# Patient Record
Sex: FEMALE | Race: WHITE | NOT HISPANIC OR LATINO | URBAN - METROPOLITAN AREA
[De-identification: names, ages, dates, MRNs, and addresses within clinical notes are randomized per-mention and may not be internally consistent; named-entity substitution may affect disease eponyms.]

---

## 2024-11-03 ENCOUNTER — EMERGENCY (EMERGENCY)
Facility: HOSPITAL | Age: 59
LOS: 1 days | Discharge: HOME | End: 2024-11-03
Attending: EMERGENCY MEDICINE | Admitting: EMERGENCY MEDICINE
Payer: COMMERCIAL

## 2024-11-03 VITALS
HEART RATE: 99 BPM | DIASTOLIC BLOOD PRESSURE: 91 MMHG | TEMPERATURE: 98 F | SYSTOLIC BLOOD PRESSURE: 149 MMHG | WEIGHT: 179.9 LBS | OXYGEN SATURATION: 100 % | RESPIRATION RATE: 18 BRPM

## 2024-11-03 VITALS
HEART RATE: 94 BPM | OXYGEN SATURATION: 95 % | SYSTOLIC BLOOD PRESSURE: 143 MMHG | RESPIRATION RATE: 20 BRPM | DIASTOLIC BLOOD PRESSURE: 85 MMHG

## 2024-11-03 LAB
ALBUMIN SERPL ELPH-MCNC: 3.5 G/DL — SIGNIFICANT CHANGE UP (ref 3.3–5)
ALP SERPL-CCNC: 100 U/L — SIGNIFICANT CHANGE UP (ref 40–120)
ALT FLD-CCNC: 21 U/L — SIGNIFICANT CHANGE UP (ref 10–45)
ANION GAP SERPL CALC-SCNC: 11 MMOL/L — SIGNIFICANT CHANGE UP (ref 5–17)
APPEARANCE UR: CLEAR — SIGNIFICANT CHANGE UP
AST SERPL-CCNC: 19 U/L — SIGNIFICANT CHANGE UP (ref 10–40)
BACTERIA # UR AUTO: NEGATIVE /HPF — SIGNIFICANT CHANGE UP
BASOPHILS # BLD AUTO: 0.03 K/UL — SIGNIFICANT CHANGE UP (ref 0–0.2)
BASOPHILS NFR BLD AUTO: 0.2 % — SIGNIFICANT CHANGE UP (ref 0–2)
BILIRUB SERPL-MCNC: 0.9 MG/DL — SIGNIFICANT CHANGE UP (ref 0.2–1.2)
BILIRUB UR-MCNC: NEGATIVE — SIGNIFICANT CHANGE UP
BUN SERPL-MCNC: 24 MG/DL — HIGH (ref 7–23)
CALCIUM SERPL-MCNC: 9.4 MG/DL — SIGNIFICANT CHANGE UP (ref 8.4–10.5)
CHLORIDE SERPL-SCNC: 99 MMOL/L — SIGNIFICANT CHANGE UP (ref 96–108)
CO2 SERPL-SCNC: 27 MMOL/L — SIGNIFICANT CHANGE UP (ref 22–31)
COLOR SPEC: SIGNIFICANT CHANGE UP
CREAT SERPL-MCNC: 0.87 MG/DL — SIGNIFICANT CHANGE UP (ref 0.5–1.3)
DIFF PNL FLD: ABNORMAL
EGFR: 78 ML/MIN/1.73M2 — SIGNIFICANT CHANGE UP
EOSINOPHIL # BLD AUTO: 0.01 K/UL — SIGNIFICANT CHANGE UP (ref 0–0.5)
EOSINOPHIL NFR BLD AUTO: 0.1 % — SIGNIFICANT CHANGE UP (ref 0–6)
EPI CELLS # UR: 3 — SIGNIFICANT CHANGE UP
GLUCOSE SERPL-MCNC: 158 MG/DL — HIGH (ref 70–99)
GLUCOSE UR QL: NEGATIVE MG/DL — SIGNIFICANT CHANGE UP
HCT VFR BLD CALC: 51.9 % — HIGH (ref 34.5–45)
HGB BLD-MCNC: 18.1 G/DL — HIGH (ref 11.5–15.5)
IMM GRANULOCYTES NFR BLD AUTO: 0.4 % — SIGNIFICANT CHANGE UP (ref 0–0.9)
KETONES UR-MCNC: ABNORMAL MG/DL
LEUKOCYTE ESTERASE UR-ACNC: NEGATIVE — SIGNIFICANT CHANGE UP
LIDOCAIN IGE QN: 11 U/L — LOW (ref 16–77)
LYMPHOCYTES # BLD AUTO: 0.81 K/UL — LOW (ref 1–3.3)
LYMPHOCYTES # BLD AUTO: 5.8 % — LOW (ref 13–44)
MCHC RBC-ENTMCNC: 31.5 PG — SIGNIFICANT CHANGE UP (ref 27–34)
MCHC RBC-ENTMCNC: 34.9 G/DL — SIGNIFICANT CHANGE UP (ref 32–36)
MCV RBC AUTO: 90.4 FL — SIGNIFICANT CHANGE UP (ref 80–100)
MONOCYTES # BLD AUTO: 0.88 K/UL — SIGNIFICANT CHANGE UP (ref 0–0.9)
MONOCYTES NFR BLD AUTO: 6.3 % — SIGNIFICANT CHANGE UP (ref 2–14)
NEUTROPHILS # BLD AUTO: 12.16 K/UL — HIGH (ref 1.8–7.4)
NEUTROPHILS NFR BLD AUTO: 87.2 % — HIGH (ref 43–77)
NITRITE UR-MCNC: NEGATIVE — SIGNIFICANT CHANGE UP
NRBC # BLD: 0 /100 WBCS — SIGNIFICANT CHANGE UP (ref 0–0)
PH UR: 5.5 — SIGNIFICANT CHANGE UP (ref 5–8)
PLATELET # BLD AUTO: 222 K/UL — SIGNIFICANT CHANGE UP (ref 150–400)
POTASSIUM SERPL-MCNC: 3.2 MMOL/L — LOW (ref 3.5–5.3)
POTASSIUM SERPL-SCNC: 3.2 MMOL/L — LOW (ref 3.5–5.3)
PROT SERPL-MCNC: 7.8 G/DL — SIGNIFICANT CHANGE UP (ref 6–8.3)
PROT UR-MCNC: 100 MG/DL
RBC # BLD: 5.74 M/UL — HIGH (ref 3.8–5.2)
RBC # FLD: 13.1 % — SIGNIFICANT CHANGE UP (ref 10.3–14.5)
RBC CASTS # UR COMP ASSIST: 4 /HPF — SIGNIFICANT CHANGE UP (ref 0–4)
SODIUM SERPL-SCNC: 137 MMOL/L — SIGNIFICANT CHANGE UP (ref 135–145)
SP GR SPEC: 1.03 — SIGNIFICANT CHANGE UP (ref 1–1.03)
UROBILINOGEN FLD QL: 1 MG/DL — SIGNIFICANT CHANGE UP (ref 0.2–1)
WBC # BLD: 13.95 K/UL — HIGH (ref 3.8–10.5)
WBC # FLD AUTO: 13.95 K/UL — HIGH (ref 3.8–10.5)
WBC UR QL: 1 /HPF — SIGNIFICANT CHANGE UP (ref 0–5)

## 2024-11-03 PROCEDURE — 96361 HYDRATE IV INFUSION ADD-ON: CPT

## 2024-11-03 PROCEDURE — 93005 ELECTROCARDIOGRAM TRACING: CPT

## 2024-11-03 PROCEDURE — 85025 COMPLETE CBC W/AUTO DIFF WBC: CPT

## 2024-11-03 PROCEDURE — 96365 THER/PROPH/DIAG IV INF INIT: CPT | Mod: XU

## 2024-11-03 PROCEDURE — 93010 ELECTROCARDIOGRAM REPORT: CPT

## 2024-11-03 PROCEDURE — 74177 CT ABD & PELVIS W/CONTRAST: CPT | Mod: 26,MC

## 2024-11-03 PROCEDURE — 71045 X-RAY EXAM CHEST 1 VIEW: CPT

## 2024-11-03 PROCEDURE — 99285 EMERGENCY DEPT VISIT HI MDM: CPT

## 2024-11-03 PROCEDURE — 83690 ASSAY OF LIPASE: CPT

## 2024-11-03 PROCEDURE — 81001 URINALYSIS AUTO W/SCOPE: CPT

## 2024-11-03 PROCEDURE — 96375 TX/PRO/DX INJ NEW DRUG ADDON: CPT

## 2024-11-03 PROCEDURE — 74177 CT ABD & PELVIS W/CONTRAST: CPT | Mod: MC

## 2024-11-03 PROCEDURE — 96376 TX/PRO/DX INJ SAME DRUG ADON: CPT

## 2024-11-03 PROCEDURE — 80053 COMPREHEN METABOLIC PANEL: CPT

## 2024-11-03 PROCEDURE — 99285 EMERGENCY DEPT VISIT HI MDM: CPT | Mod: 25

## 2024-11-03 PROCEDURE — 36415 COLL VENOUS BLD VENIPUNCTURE: CPT

## 2024-11-03 PROCEDURE — 71045 X-RAY EXAM CHEST 1 VIEW: CPT | Mod: 26

## 2024-11-03 RX ORDER — SODIUM CHLORIDE 9 MG/ML
500 INJECTION, SOLUTION INTRAMUSCULAR; INTRAVENOUS; SUBCUTANEOUS ONCE
Refills: 0 | Status: COMPLETED | OUTPATIENT
Start: 2024-11-03 | End: 2024-11-03

## 2024-11-03 RX ORDER — ONDANSETRON HYDROCHLORIDE 2 MG/ML
4 INJECTION, SOLUTION INTRAMUSCULAR; INTRAVENOUS ONCE
Refills: 0 | Status: COMPLETED | OUTPATIENT
Start: 2024-11-03 | End: 2024-11-03

## 2024-11-03 RX ORDER — ONDANSETRON HYDROCHLORIDE 2 MG/ML
1 INJECTION, SOLUTION INTRAMUSCULAR; INTRAVENOUS
Qty: 1 | Refills: 0
Start: 2024-11-03 | End: 2024-11-05

## 2024-11-03 RX ORDER — METRONIDAZOLE 250 MG/1
500 TABLET ORAL ONCE
Refills: 0 | Status: COMPLETED | OUTPATIENT
Start: 2024-11-03 | End: 2024-11-03

## 2024-11-03 RX ORDER — SODIUM CHLORIDE 9 MG/ML
1500 INJECTION, SOLUTION INTRAMUSCULAR; INTRAVENOUS; SUBCUTANEOUS ONCE
Refills: 0 | Status: COMPLETED | OUTPATIENT
Start: 2024-11-03 | End: 2024-11-03

## 2024-11-03 RX ORDER — HYDROMORPHONE HCL/0.9% NACL/PF 6 MG/30 ML
1 PATIENT CONTROLLED ANALGESIA SYRINGE INTRAVENOUS ONCE
Refills: 0 | Status: DISCONTINUED | OUTPATIENT
Start: 2024-11-03 | End: 2024-11-03

## 2024-11-03 RX ORDER — CIPROFLOXACIN LACTATE 200MG/20ML
500 VIAL (ML) INTRAVENOUS ONCE
Refills: 0 | Status: COMPLETED | OUTPATIENT
Start: 2024-11-03 | End: 2024-11-03

## 2024-11-03 RX ORDER — METRONIDAZOLE 250 MG/1
1 TABLET ORAL
Qty: 30 | Refills: 0
Start: 2024-11-03 | End: 2024-11-12

## 2024-11-03 RX ORDER — CIPROFLOXACIN LACTATE 200MG/20ML
1 VIAL (ML) INTRAVENOUS
Qty: 20 | Refills: 0
Start: 2024-11-03 | End: 2024-11-12

## 2024-11-03 RX ORDER — POTASSIUM CHLORIDE 10 MEQ
40 TABLET, EXTENDED RELEASE ORAL ONCE
Refills: 0 | Status: COMPLETED | OUTPATIENT
Start: 2024-11-03 | End: 2024-11-03

## 2024-11-03 RX ORDER — FAMOTIDINE 10 MG/ML
20 INJECTION INTRAVENOUS ONCE
Refills: 0 | Status: COMPLETED | OUTPATIENT
Start: 2024-11-03 | End: 2024-11-03

## 2024-11-03 RX ADMIN — SODIUM CHLORIDE 1000 MILLILITER(S): 9 INJECTION, SOLUTION INTRAMUSCULAR; INTRAVENOUS; SUBCUTANEOUS at 13:39

## 2024-11-03 RX ADMIN — Medication 40 MILLIEQUIVALENT(S): at 12:31

## 2024-11-03 RX ADMIN — METRONIDAZOLE 500 MILLIGRAM(S): 250 TABLET ORAL at 13:39

## 2024-11-03 RX ADMIN — Medication 1 MILLIGRAM(S): at 11:45

## 2024-11-03 RX ADMIN — SODIUM CHLORIDE 1500 MILLILITER(S): 9 INJECTION, SOLUTION INTRAMUSCULAR; INTRAVENOUS; SUBCUTANEOUS at 11:29

## 2024-11-03 RX ADMIN — FAMOTIDINE 100 MILLIGRAM(S): 10 INJECTION INTRAVENOUS at 11:30

## 2024-11-03 RX ADMIN — FAMOTIDINE 20 MILLIGRAM(S): 10 INJECTION INTRAVENOUS at 12:00

## 2024-11-03 RX ADMIN — ONDANSETRON HYDROCHLORIDE 4 MILLIGRAM(S): 2 INJECTION, SOLUTION INTRAMUSCULAR; INTRAVENOUS at 11:29

## 2024-11-03 RX ADMIN — SODIUM CHLORIDE 1500 MILLILITER(S): 9 INJECTION, SOLUTION INTRAMUSCULAR; INTRAVENOUS; SUBCUTANEOUS at 13:00

## 2024-11-03 RX ADMIN — Medication 1 MILLIGRAM(S): at 11:30

## 2024-11-03 RX ADMIN — Medication 1 MILLIGRAM(S): at 13:44

## 2024-11-03 RX ADMIN — SODIUM CHLORIDE 500 MILLILITER(S): 9 INJECTION, SOLUTION INTRAMUSCULAR; INTRAVENOUS; SUBCUTANEOUS at 14:10

## 2024-11-03 RX ADMIN — Medication 1 MILLIGRAM(S): at 13:39

## 2024-11-03 RX ADMIN — Medication 500 MILLIGRAM(S): at 13:39

## 2024-11-03 NOTE — ED ADULT NURSE NOTE - NSFALLUNIVINTERV_ED_ALL_ED
Bed/Stretcher in lowest position, wheels locked, appropriate side rails in place/Call bell, personal items and telephone in reach/Instruct patient to call for assistance before getting out of bed/chair/stretcher/Non-slip footwear applied when patient is off stretcher/[place] to call system/Physically safe environment - no spills, clutter or unnecessary equipment/Purposeful proactive rounding/Room/bathroom lighting operational, light cord in reach

## 2024-11-03 NOTE — ED ADULT NURSE NOTE - OBJECTIVE STATEMENT
"Eduardo Esposito presents to the ED with c/o wound to left breast. Patient reports having a lumpectomy in June. A few days ago"It busted and gushed like a volcano." + for green drainage to left nipple in 8 o'clock area, mild erythema with slight tenderness. Patient denies any fever and is afebrile upon arrival to the ED. Patient reports that she has not followed up with Dr. Howard, but has been taking and antibiotic that was given to her by his nurse.   " pt BIB EMS from home for severe abd pain, reporting vomiting and diarrhea with abdominal pain since last night.  Patient states she thought she was coming down with a cough or cold.  She took amoxicillin that her  gave to her.  Patient states that classically she does not do well with amoxicillin however 40 minutes to an hour after taking it, she had onset of the symptoms.  Patient has been dry heaving and retching and vomiting since yesterday.  Unable to tolerate p.o.  Also having loose stool.  They question if there was pink-tinged stool and dark emesis.  Patient states she had an endoscopy not that long ago and was told that she has inflammation in the stomach.  She is on omeprazole. Also patient states she has history of abdominal aortic aneurysms and she is scheduled to have surgery December 2 at [facility].

## 2024-11-03 NOTE — ED PROVIDER NOTE - NSFOLLOWUPINSTRUCTIONS_ED_ALL_ED_FT
Your CAT scan demonstrates colitis of the transverse, descending and sigmoid colon.  That is the upper left side and lower abdomen.  For this, we recommend taking antibiotics that are Cipro 500 mg twice per day for 10 days, metronidazole 500 mg 3 times a day for 10 days.  Do not drink any alcohol while taking this medication.  Try not to take on a completely empty stomach as we have discussed. Also either take a probiotic or have foods that are rich in probiotics such as Greek yogurt. Please follow-up with your primary medical doctor.  A copy of your results are attached.  Also, follow-up with a gastroenterologist given the diagnosis.  If there is any acute concerns or worsening at any time, return to the emergency department    While in the emergency department, you fell asleep.  We note the snoring and apneic episodes.  It is important that you obtain a sleep study.  Is very likely that you have obstructive sleep apnea and may need a machine to sleep at night.  This can cause headaches throughout your day as well as increase sleepiness throughout your day because you are not sleeping restfully.  Also noted is the heavy cough.  The forceful cough is likely due to smoking.  Please make sure to follow-up with your primary medical doctor to address these issues.  In the Emergency Department today, we did not appreciate any abnormal findings on your chest xray. We will submit to our radiologist for FINAL review. If there are any new findings or concerning findings that were missed in the Emergency Department, we will notify you at the number provided upon registration.

## 2024-11-03 NOTE — ED PROVIDER NOTE - PHYSICAL EXAMINATION
Vitals: I have reviewed the patients vital signs  General: nontoxic appearing  HEENT: Atraumatic, normocephalic, airway patent, dry oral mucosa   Eyes: EOMI, tracking appropriately  Neck: no tracheal deviation  Chest/Lungs: no trauma, symmetric chest rise, speaking in complete sentences,  no resp distress, clear lungs  Heart: skin and extremities well perfused, regular rate and rhythm  Abd: no pulsatile abd mass; tender LLQ ; Mild epigastric tenderness; soft. Nondistended.   Neuro: A+Ox3,  appears non focal  Distal DP pulses normal and equal b/l  Skin: no cyanosis, no jaundice

## 2024-11-03 NOTE — ED PROVIDER NOTE - OBJECTIVE STATEMENT
59-year-old female presents to the emergency department for upset stomach.  Patient reporting vomiting and diarrhea with abdominal pain.  Patient states she thought she was coming down with a cough or cold.  She took amoxicillin that her  gave to her.  Patient states that classically she does not do well with amoxicillin however 40 minutes to an hour after taking it, she had onset of the symptoms.  Patient has been dry heaving and retching and vomiting since yesterday.  Unable to tolerate p.o.  Also having loose stool.  They question if there was pink-tinged stool and dark emesis.  Patient states she had an endoscopy not that long ago and was told that she has inflammation in the stomach.  She is on omeprazole.    Also patient states she has history of abdominal aortic aneurysms and she is scheduled to have surgery December 2 at [facility]. 59-year-old female presents to the emergency department for upset stomach.  Patient reporting vomiting and diarrhea with abdominal pain.  Patient states she thought she was coming down with a cough or cold.  She took amoxicillin that her  gave to her.  Patient states that classically she does not do well with amoxicillin however 40 minutes to an hour after taking it, she had onset of the symptoms.  Patient has been dry heaving and retching and vomiting since yesterday.  Unable to tolerate p.o.  Also having loose stool.  They question if there was pink-tinged stool and dark emesis.  Patient states she had an endoscopy not that long ago and was told that she has inflammation in the stomach.  She is on omeprazole.    Also patient states she has history of abdominal aortic aneurysms 5.8 and 2.8 and she is scheduled to have surgery December 2 at [facility]. Sees Vascular Dr RICHA Lechuga. 59-year-old female presents to the emergency department for upset stomach.  Patient reporting vomiting and diarrhea with abdominal pain.  Patient states she thought she was coming down with a cough or cold.  She took amoxicillin that her  gave to her.  Patient states that classically she does not do well with amoxicillin however 40 minutes to an hour after taking it, she had onset of the symptoms.  Patient has been dry heaving and retching and vomiting since yesterday.  Unable to tolerate p.o.  Also having loose stool.  They question if there was pink-tinged stool and emesis.  Patient states she had an endoscopy not that long ago and was told that she has inflammation in the stomach.  She is on omeprazole.    Also patient states she has history of abdominal aortic aneurysms and she is scheduled to have surgery December 2 at [facility].

## 2024-11-03 NOTE — ED PROVIDER NOTE - PATIENT PORTAL LINK FT
You can access the FollowMyHealth Patient Portal offered by [facility] by registering at the following website: [email or web address]. By joining [facility]’s FollowMyHealth portal, you will also be able to view your health information using other applications (apps) compatible with our system.

## 2024-11-03 NOTE — ED ADULT TRIAGE NOTE - CHIEF COMPLAINT QUOTE
Patient BIB EMS from home with abdominal pain & vomiting with blood tinged emesis. Patient has hx of abdominal aneurysms, awaiting cardiac clearance for surgery.

## 2024-11-03 NOTE — ED PROVIDER NOTE - CLINICAL SUMMARY MEDICAL DECISION MAKING FREE TEXT BOX
59-year-old female presents to the emergency department for upset stomach.  Patient reporting vomiting and diarrhea with abdominal pain.  Patient states she thought she was coming down with a cough or cold.  She took amoxicillin that her  gave to her.  Patient states that classically she does not do well with amoxicillin however 40 minutes to an hour after taking it, she had onset of the symptoms.  Patient has been dry heaving and retching and vomiting since yesterday.  Unable to tolerate p.o.  Also having loose stool.  They question if there was pink-tinged stool and dark emesis.  Patient states she had an endoscopy not that long ago and was told that she has inflammation in the stomach.  She is on omeprazole.    Also patient states she has history of abdominal aortic aneurysms and she is scheduled to have surgery December 2 at [facility].    Exam as stated.  Left lower quadrant tenderness is notable.  Plan for CT abdomen pelvis.  Patient states she is allergic to morphine.  She states she can tolerate Dilaudid.  Will give that an antiemetic and Pepcid.  IV fluids.  Reassess. 59-year-old female presents to the emergency department for upset stomach.  Patient reporting vomiting and diarrhea with abdominal pain.  Patient states she thought she was coming down with a cough or cold.  She took amoxicillin that her  gave to her.  Patient states that classically she does not do well with amoxicillin however 40 minutes to an hour after taking it, she had onset of the symptoms.  Patient has been dry heaving and retching and vomiting since yesterday.  Unable to tolerate p.o.  Also having loose stool.  They question if there was pink-tinged stool and emesis.  Patient states she had an endoscopy not that long ago and was told that she has inflammation in the stomach.  She is on omeprazole.    Also patient states she has history of abdominal aortic aneurysms and she is scheduled to have surgery December 2 at [facility].    Exam as stated.  Left lower quadrant tenderness is notable.  Plan for CT abdomen pelvis.  Patient states she is allergic to morphine.  She states she can tolerate Dilaudid.  Will give that an antiemetic and Pepcid.  IV fluids.  Reassess.    During patient's ED stay, patient with heavy snoring during sleep.  Periods of apnea.  Strongly advised patient to undergo a sleep study for further assessment of this.  Also patient has a smoker's cough.  A notably heavy and forceful cough.  Pink-tinged emesis is likely due to retching.  Consider Margaux-Virk tears.  H&H normal.  No hypotension.  Patient stable.    Findings on imaging demonstrate colitis of the transverse descending and sigmoid colon.  Patient's labs reviewed.  White count of 13.9.  Patient states she feels improved.  Offered admission however patient wants to go home.  Patient has been at bedside.  Patient states if there is any worsening or difficulty with management at home, she will return to the emergency department as needed.  Will prescribe Cipro and Flagyl.  Will give Zofran as needed.

## 2024-11-05 ENCOUNTER — INPATIENT (INPATIENT)
Facility: HOSPITAL | Age: 59
LOS: 0 days | Discharge: HOME | DRG: 392 | End: 2024-11-06
Attending: HOSPITALIST | Admitting: INTERNAL MEDICINE
Payer: COMMERCIAL

## 2024-11-05 VITALS
HEIGHT: 66 IN | SYSTOLIC BLOOD PRESSURE: 120 MMHG | WEIGHT: 179.9 LBS | TEMPERATURE: 98 F | DIASTOLIC BLOOD PRESSURE: 80 MMHG | HEART RATE: 100 BPM | RESPIRATION RATE: 18 BRPM | OXYGEN SATURATION: 93 %

## 2024-11-05 DIAGNOSIS — K52.9 NONINFECTIVE GASTROENTERITIS AND COLITIS, UNSPECIFIED: ICD-10-CM

## 2024-11-05 LAB
ALBUMIN SERPL ELPH-MCNC: 2.6 G/DL — LOW (ref 3.3–5)
ALP SERPL-CCNC: 91 U/L — SIGNIFICANT CHANGE UP (ref 40–120)
ALT FLD-CCNC: 15 U/L — SIGNIFICANT CHANGE UP (ref 10–45)
ANION GAP SERPL CALC-SCNC: 5 MMOL/L — SIGNIFICANT CHANGE UP (ref 5–17)
APPEARANCE UR: CLEAR — SIGNIFICANT CHANGE UP
AST SERPL-CCNC: 15 U/L — SIGNIFICANT CHANGE UP (ref 10–40)
BACTERIA # UR AUTO: NEGATIVE /HPF — SIGNIFICANT CHANGE UP
BASOPHILS # BLD AUTO: 0.04 K/UL — SIGNIFICANT CHANGE UP (ref 0–0.2)
BASOPHILS NFR BLD AUTO: 0.3 % — SIGNIFICANT CHANGE UP (ref 0–2)
BILIRUB SERPL-MCNC: 0.5 MG/DL — SIGNIFICANT CHANGE UP (ref 0.2–1.2)
BILIRUB UR-MCNC: NEGATIVE — SIGNIFICANT CHANGE UP
BUN SERPL-MCNC: 8 MG/DL — SIGNIFICANT CHANGE UP (ref 7–23)
CALCIUM SERPL-MCNC: 8 MG/DL — LOW (ref 8.4–10.5)
CHLORIDE SERPL-SCNC: 97 MMOL/L — SIGNIFICANT CHANGE UP (ref 96–108)
CO2 SERPL-SCNC: 34 MMOL/L — HIGH (ref 22–31)
COLOR SPEC: YELLOW — SIGNIFICANT CHANGE UP
CREAT SERPL-MCNC: 0.76 MG/DL — SIGNIFICANT CHANGE UP (ref 0.5–1.3)
DIFF PNL FLD: ABNORMAL
EGFR: 91 ML/MIN/1.73M2 — SIGNIFICANT CHANGE UP
EOSINOPHIL # BLD AUTO: 0.18 K/UL — SIGNIFICANT CHANGE UP (ref 0–0.5)
EOSINOPHIL NFR BLD AUTO: 1.2 % — SIGNIFICANT CHANGE UP (ref 0–6)
EPI CELLS # UR: SIGNIFICANT CHANGE UP
GLUCOSE SERPL-MCNC: 122 MG/DL — HIGH (ref 70–99)
GLUCOSE UR QL: NEGATIVE MG/DL — SIGNIFICANT CHANGE UP
HCT VFR BLD CALC: 44 % — SIGNIFICANT CHANGE UP (ref 34.5–45)
HGB BLD-MCNC: 14.8 G/DL — SIGNIFICANT CHANGE UP (ref 11.5–15.5)
IMM GRANULOCYTES NFR BLD AUTO: 0.5 % — SIGNIFICANT CHANGE UP (ref 0–0.9)
KETONES UR-MCNC: NEGATIVE MG/DL — SIGNIFICANT CHANGE UP
LACTATE SERPL-SCNC: 1.3 MMOL/L — SIGNIFICANT CHANGE UP (ref 0.7–2)
LEUKOCYTE ESTERASE UR-ACNC: NEGATIVE — SIGNIFICANT CHANGE UP
LIDOCAIN IGE QN: 18 U/L — SIGNIFICANT CHANGE UP (ref 16–77)
LYMPHOCYTES # BLD AUTO: 1.32 K/UL — SIGNIFICANT CHANGE UP (ref 1–3.3)
LYMPHOCYTES # BLD AUTO: 9.1 % — LOW (ref 13–44)
MCHC RBC-ENTMCNC: 31.8 PG — SIGNIFICANT CHANGE UP (ref 27–34)
MCHC RBC-ENTMCNC: 33.6 G/DL — SIGNIFICANT CHANGE UP (ref 32–36)
MCV RBC AUTO: 94.6 FL — SIGNIFICANT CHANGE UP (ref 80–100)
MONOCYTES # BLD AUTO: 0.6 K/UL — SIGNIFICANT CHANGE UP (ref 0–0.9)
MONOCYTES NFR BLD AUTO: 4.1 % — SIGNIFICANT CHANGE UP (ref 2–14)
NEUTROPHILS # BLD AUTO: 12.26 K/UL — HIGH (ref 1.8–7.4)
NEUTROPHILS NFR BLD AUTO: 84.8 % — HIGH (ref 43–77)
NITRITE UR-MCNC: NEGATIVE — SIGNIFICANT CHANGE UP
NRBC # BLD: 0 /100 WBCS — SIGNIFICANT CHANGE UP (ref 0–0)
PH UR: 5.5 — SIGNIFICANT CHANGE UP (ref 5–8)
PLATELET # BLD AUTO: 183 K/UL — SIGNIFICANT CHANGE UP (ref 150–400)
POTASSIUM SERPL-MCNC: 3.7 MMOL/L — SIGNIFICANT CHANGE UP (ref 3.5–5.3)
POTASSIUM SERPL-SCNC: 3.7 MMOL/L — SIGNIFICANT CHANGE UP (ref 3.5–5.3)
PROT SERPL-MCNC: 6.4 G/DL — SIGNIFICANT CHANGE UP (ref 6–8.3)
PROT UR-MCNC: NEGATIVE MG/DL — SIGNIFICANT CHANGE UP
RBC # BLD: 4.65 M/UL — SIGNIFICANT CHANGE UP (ref 3.8–5.2)
RBC # FLD: 13 % — SIGNIFICANT CHANGE UP (ref 10.3–14.5)
RBC CASTS # UR COMP ASSIST: 10 /HPF — HIGH (ref 0–4)
SODIUM SERPL-SCNC: 136 MMOL/L — SIGNIFICANT CHANGE UP (ref 135–145)
SP GR SPEC: 1.01 — SIGNIFICANT CHANGE UP (ref 1–1.03)
UROBILINOGEN FLD QL: 0.2 MG/DL — SIGNIFICANT CHANGE UP (ref 0.2–1)
WBC # BLD: 14.47 K/UL — HIGH (ref 3.8–10.5)
WBC # FLD AUTO: 14.47 K/UL — HIGH (ref 3.8–10.5)
WBC UR QL: 0 /HPF — SIGNIFICANT CHANGE UP (ref 0–5)

## 2024-11-05 PROCEDURE — 99223 1ST HOSP IP/OBS HIGH 75: CPT | Mod: GC

## 2024-11-05 PROCEDURE — 99223 1ST HOSP IP/OBS HIGH 75: CPT

## 2024-11-05 PROCEDURE — 74177 CT ABD & PELVIS W/CONTRAST: CPT | Mod: 26,MC

## 2024-11-05 PROCEDURE — 99285 EMERGENCY DEPT VISIT HI MDM: CPT

## 2024-11-05 PROCEDURE — 93010 ELECTROCARDIOGRAM REPORT: CPT

## 2024-11-05 RX ORDER — OXYCODONE HYDROCHLORIDE 30 MG/1
20 TABLET ORAL THREE TIMES A DAY
Refills: 0 | Status: DISCONTINUED | OUTPATIENT
Start: 2024-11-05 | End: 2024-11-05

## 2024-11-05 RX ORDER — CLONAZEPAM 1 MG
2 TABLET ORAL
Refills: 0 | Status: DISCONTINUED | OUTPATIENT
Start: 2024-11-05 | End: 2024-11-06

## 2024-11-05 RX ORDER — METRONIDAZOLE 250 MG/1
500 TABLET ORAL EVERY 8 HOURS
Refills: 0 | Status: DISCONTINUED | OUTPATIENT
Start: 2024-11-05 | End: 2024-11-06

## 2024-11-05 RX ORDER — CIPROFLOXACIN LACTATE 200MG/20ML
200 VIAL (ML) INTRAVENOUS EVERY 12 HOURS
Refills: 0 | Status: DISCONTINUED | OUTPATIENT
Start: 2024-11-05 | End: 2024-11-06

## 2024-11-05 RX ORDER — METRONIDAZOLE 250 MG/1
500 TABLET ORAL ONCE
Refills: 0 | Status: COMPLETED | OUTPATIENT
Start: 2024-11-05 | End: 2024-11-05

## 2024-11-05 RX ORDER — ACETAMINOPHEN 500 MG
650 TABLET ORAL EVERY 6 HOURS
Refills: 0 | Status: DISCONTINUED | OUTPATIENT
Start: 2024-11-05 | End: 2024-11-06

## 2024-11-05 RX ORDER — HYDROMORPHONE HCL/0.9% NACL/PF 6 MG/30 ML
1 PATIENT CONTROLLED ANALGESIA SYRINGE INTRAVENOUS ONCE
Refills: 0 | Status: DISCONTINUED | OUTPATIENT
Start: 2024-11-05 | End: 2024-11-05

## 2024-11-05 RX ORDER — ONDANSETRON HYDROCHLORIDE 2 MG/ML
4 INJECTION, SOLUTION INTRAMUSCULAR; INTRAVENOUS EVERY 8 HOURS
Refills: 0 | Status: DISCONTINUED | OUTPATIENT
Start: 2024-11-05 | End: 2024-11-06

## 2024-11-05 RX ORDER — ONDANSETRON HYDROCHLORIDE 2 MG/ML
4 INJECTION, SOLUTION INTRAMUSCULAR; INTRAVENOUS ONCE
Refills: 0 | Status: COMPLETED | OUTPATIENT
Start: 2024-11-05 | End: 2024-11-05

## 2024-11-05 RX ORDER — HYDROMORPHONE HCL/0.9% NACL/PF 6 MG/30 ML
0.25 PATIENT CONTROLLED ANALGESIA SYRINGE INTRAVENOUS EVERY 6 HOURS
Refills: 0 | Status: DISCONTINUED | OUTPATIENT
Start: 2024-11-05 | End: 2024-11-06

## 2024-11-05 RX ORDER — CLONAZEPAM 1 MG
2 TABLET ORAL
Refills: 0 | Status: DISCONTINUED | OUTPATIENT
Start: 2024-11-05 | End: 2024-11-05

## 2024-11-05 RX ORDER — SODIUM CHLORIDE 9 MG/ML
1000 INJECTION, SOLUTION INTRAMUSCULAR; INTRAVENOUS; SUBCUTANEOUS ONCE
Refills: 0 | Status: COMPLETED | OUTPATIENT
Start: 2024-11-05 | End: 2024-11-05

## 2024-11-05 RX ORDER — HEPARIN SODIUM 10000 [USP'U]/ML
5000 INJECTION INTRAVENOUS; SUBCUTANEOUS EVERY 12 HOURS
Refills: 0 | Status: DISCONTINUED | OUTPATIENT
Start: 2024-11-05 | End: 2024-11-06

## 2024-11-05 RX ORDER — CIPROFLOXACIN LACTATE 200MG/20ML
400 VIAL (ML) INTRAVENOUS ONCE
Refills: 0 | Status: COMPLETED | OUTPATIENT
Start: 2024-11-05 | End: 2024-11-05

## 2024-11-05 RX ORDER — OXYCODONE HYDROCHLORIDE 30 MG/1
20 TABLET ORAL THREE TIMES A DAY
Refills: 0 | Status: DISCONTINUED | OUTPATIENT
Start: 2024-11-05 | End: 2024-11-06

## 2024-11-05 RX ORDER — HYDROMORPHONE HCL/0.9% NACL/PF 6 MG/30 ML
0.25 PATIENT CONTROLLED ANALGESIA SYRINGE INTRAVENOUS EVERY 6 HOURS
Refills: 0 | Status: DISCONTINUED | OUTPATIENT
Start: 2024-11-05 | End: 2024-11-05

## 2024-11-05 RX ORDER — PANTOPRAZOLE SODIUM 40 MG/1
40 TABLET, DELAYED RELEASE ORAL
Refills: 0 | Status: DISCONTINUED | OUTPATIENT
Start: 2024-11-05 | End: 2024-11-06

## 2024-11-05 RX ORDER — SENNA 187 MG
2 TABLET ORAL AT BEDTIME
Refills: 0 | Status: DISCONTINUED | OUTPATIENT
Start: 2024-11-05 | End: 2024-11-06

## 2024-11-05 RX ADMIN — ONDANSETRON HYDROCHLORIDE 4 MILLIGRAM(S): 2 INJECTION, SOLUTION INTRAMUSCULAR; INTRAVENOUS at 01:11

## 2024-11-05 RX ADMIN — Medication 400 MILLIGRAM(S): at 02:15

## 2024-11-05 RX ADMIN — METRONIDAZOLE 100 MILLIGRAM(S): 250 TABLET ORAL at 23:55

## 2024-11-05 RX ADMIN — Medication 200 MILLIGRAM(S): at 01:13

## 2024-11-05 RX ADMIN — METRONIDAZOLE 100 MILLIGRAM(S): 250 TABLET ORAL at 07:31

## 2024-11-05 RX ADMIN — ONDANSETRON HYDROCHLORIDE 4 MILLIGRAM(S): 2 INJECTION, SOLUTION INTRAMUSCULAR; INTRAVENOUS at 16:49

## 2024-11-05 RX ADMIN — SODIUM CHLORIDE 1000 MILLILITER(S): 9 INJECTION, SOLUTION INTRAMUSCULAR; INTRAVENOUS; SUBCUTANEOUS at 01:14

## 2024-11-05 RX ADMIN — SODIUM CHLORIDE 1000 MILLILITER(S): 9 INJECTION, SOLUTION INTRAMUSCULAR; INTRAVENOUS; SUBCUTANEOUS at 02:15

## 2024-11-05 RX ADMIN — PANTOPRAZOLE SODIUM 40 MILLIGRAM(S): 40 TABLET, DELAYED RELEASE ORAL at 06:23

## 2024-11-05 RX ADMIN — HEPARIN SODIUM 5000 UNIT(S): 10000 INJECTION INTRAVENOUS; SUBCUTANEOUS at 20:55

## 2024-11-05 RX ADMIN — Medication 1 MILLIGRAM(S): at 01:11

## 2024-11-05 RX ADMIN — OXYCODONE HYDROCHLORIDE 20 MILLIGRAM(S): 30 TABLET ORAL at 07:38

## 2024-11-05 RX ADMIN — METRONIDAZOLE 100 MILLIGRAM(S): 250 TABLET ORAL at 16:49

## 2024-11-05 RX ADMIN — OXYCODONE HYDROCHLORIDE 20 MILLIGRAM(S): 30 TABLET ORAL at 06:22

## 2024-11-05 RX ADMIN — Medication 120 MILLILITER(S): at 16:53

## 2024-11-05 RX ADMIN — Medication 120 MILLILITER(S): at 07:32

## 2024-11-05 RX ADMIN — Medication 100 MILLIGRAM(S): at 20:54

## 2024-11-05 RX ADMIN — Medication 1 MILLIGRAM(S): at 02:00

## 2024-11-05 RX ADMIN — Medication 100 MILLIGRAM(S): at 06:24

## 2024-11-05 RX ADMIN — METRONIDAZOLE 100 MILLIGRAM(S): 250 TABLET ORAL at 02:17

## 2024-11-05 RX ADMIN — Medication 120 MILLILITER(S): at 06:28

## 2024-11-05 RX ADMIN — Medication 2 MILLIGRAM(S): at 06:22

## 2024-11-05 NOTE — CONSULT NOTE ADULT - ASSESSMENT
60 y/o female with past medical hx of Spinal Stenosis, Obesity, GERD and Abdominal Aortic Aneurism present today at the ED c/o severe abdominal pain that started 3 days ago with moderate to severe colic abdominal pain associated with nausea, vomiting and diarrhea, Patient was seen in the ED on November 3 where she was diagnosed with colitis of the distal transverse colon descending colon and sigmoid colon.  At that point time she got pain medication antibiotics.  She was offered admission but decided she wanted to go home. Patient stated she had a root canal procedure and  had fever and sore throat a week ago and was taking Amoxicillin a week before her symptoms started. Patient currently with Colic abdominal pain and nausea, denies fever, chills, sore throat, teeth pain, vomiting, diarrhea.  In the ED her vitals sign were BP: 120/80, HR:100, RR:18, SatO2:93, Temp:97.9, Labs were pertinent for: WBC Count: 14.47: CT Abdomen showed: Mild colitis involving the mid to distal transverse colon, descending   colon, and sigmoid colon , Patient is admitted to the floor for further work up    (05 Nov 2024 02:54)    GI consulted for abdominal pain and CT findings of colitis. Patient seen and examined. Patient with abdominal pain and loose stool for a week. Notes blood in the stool. Colonoscopy many years ago. She reports recent EGD. She did just complete short course of antibiotics for respiratory infection

## 2024-11-05 NOTE — ED PROVIDER NOTE - OBJECTIVE STATEMENT
59-year-old female presents to the emergency department today with a history of spinal stenosis with abdominal pain.  Patient was seen here on November 3 where she was diagnosed with colitis of the distal transverse colon descending colon and sigmoid colon.  At that point time she got pain medication antibiotics.  She was offered admission but decided she wanted to go home.  Yesterday pain continued and today continued as well.  States it is not worse than it was before but is consistent and severe.  There is no change in her bowel movements.  States she has been admitted to the hospital this time

## 2024-11-05 NOTE — CONSULT NOTE ADULT - PROBLEM SELECTOR RECOMMENDATION 9
R/O cdiff  CT Abd Reviewed  Monitor stools  Continue IV antibiotics for now  Check Cdiff given recent antibiotic use  Check GI PCR and O&P  Clear liquid diet ok  Monitor electrolytes  Continue IV fluids

## 2024-11-05 NOTE — ED ADULT NURSE REASSESSMENT NOTE - GENERAL PATIENT STATE
Continuous monitoring./comfortable appearance/family/SO at bedside
Comfort and safety maintained./comfortable appearance

## 2024-11-05 NOTE — ED ADULT NURSE REASSESSMENT NOTE - CONDITION
Received patient from [facility] nurse Eryn Gonzales./improved
Patient awaiting bed. Continuous monitoring./improved

## 2024-11-05 NOTE — CONSULT NOTE ADULT - SUBJECTIVE AND OBJECTIVE BOX
INTERVAL HPI/OVERNIGHT EVENTS:  HPI:  58 y/o female with past medical hx of Spinal Stenosis, Obesity, GERD and Abdominal Aortic Aneurism present today at the ED c/o severe abdominal pain that started 3 days ago with moderate to severe colic abdominal pain associated with nausea, vomiting and diarrhea, Patient was seen in the ED on November 3 where she was diagnosed with colitis of the distal transverse colon descending colon and sigmoid colon.  At that point time she got pain medication antibiotics.  She was offered admission but decided she wanted to go home. Patient stated she had a root canal procedure and  had fever and sore throat a week ago and was taking Amoxicillin a week before her symptoms started. Patient currently with Colic abdominal pain and nausea, denies fever, chills, sore throat, teeth pain, vomiting, diarrhea.  In the ED her vitals sign were BP: 120/80, HR:100, RR:18, SatO2:93, Temp:97.9, Labs were pertinent for: WBC Count: 14.47: CT Abdomen showed: Mild colitis involving the mid to distal transverse colon, descending   colon, and sigmoid colon , Patient is admitted to the floor for further work up    (2024 02:54)    GI consulted for abdominal pain and CT findings of colitis. Patient seen and examined. Patient with abdominal pain and loose stool for a week. Notes blood in the stool. Colonoscopy many years ago. She reports recent EGD. She did just complete short course of antibiotics for respiratory infection     MEDICATIONS  (STANDING):  ciprofloxacin   IVPB 200 milliGRAM(s) IV Intermittent every 12 hours  lactated ringers. 1000 milliLiter(s) (120 mL/Hr) IV Continuous <Continuous>  metroNIDAZOLE  IVPB 500 milliGRAM(s) IV Intermittent every 8 hours  pantoprazole    Tablet 40 milliGRAM(s) Oral before breakfast  senna 2 Tablet(s) Oral at bedtime    MEDICATIONS  (PRN):  acetaminophen     Tablet .. 650 milliGRAM(s) Oral every 6 hours PRN Mild Pain (1 - 3)  clonazePAM  Tablet 2 milliGRAM(s) Oral two times a day PRN anxiety  ondansetron Injectable 4 milliGRAM(s) IV Push every 8 hours PRN Nausea and/or Vomiting  oxyCODONE    IR 20 milliGRAM(s) Oral three times a day PRN Moderate Pain (4 - 6)      Allergies    morphine (Unknown)    Intolerances        PAST MEDICAL & SURGICAL HISTORY:  Mental health problem      No significant past surgical history          REVIEW OF SYSTEMS: negative unless indicated in HPI    	    PHYSICAL EXAM:   Vital Signs:  Vital Signs Last 24 Hrs  T(C): 36.7 (2024 06:00), Max: 36.7 (2024 02:30)  T(F): 98 (2024 06:00), Max: 98 (2024 02:30)  HR: 88 (:30) (88 - 100)  BP: 109/70 (:30) (92/56 - 126/76)  BP(mean): --  RR: 14 (2024 11:30) (14 - 18)  SpO2: 86% (:30) (86% - 99%)    Parameters below as of 2024 11:30  Patient On (Oxygen Delivery Method): nasal cannula  O2 Flow (L/min): 2    Daily Height in cm: 167.64 (2024 00:41)    Daily I&O's Summary      GENERAL:  Appears stated age  HEENT:  NC/AT,  conjunctivae clear and pink,  CHEST:  Full & symmetric excursion, no increased effort, breath sounds clear  HEART:  Regular rhythm, S1, S2, no murmur  ABDOMEN:  Soft, tender, non-distended, normoactive bowel sounds,  EXTEREMITIES:  no  edema  SKIN:  No rash/warm/dry  NEURO:  Alert, oriented,       LABS:                        14.8   14.47 )-----------( 183      ( 2024 01:05 )             44.0         136  |  97  |  8   ----------------------------<  122[H]  3.7   |  34[H]  |  0.76    Ca    8.0[L]      :05    TPro  6.4  /  Alb  2.6[L]  /  TBili  0.5  /  DBili  x   /  AST  15  /  ALT  15  /  AlkPhos  91        Urinalysis Basic - ( 2024 01:05 )    Color: Yellow / Appearance: Clear / S.012 / pH: x  Gluc: 122 mg/dL / Ketone: Negative mg/dL  / Bili: Negative / Urobili: 0.2 mg/dL   Blood: x / Protein: Negative mg/dL / Nitrite: Negative   Leuk Esterase: Negative / RBC: 10 /HPF / WBC 0 /HPF   Sq Epi: x / Non Sq Epi: x / Bacteria: Negative /HPF      Lipase: 18 U/L ( @ 01:05)  Lipase: 11 U/L ( @ 11:18)

## 2024-11-05 NOTE — H&P ADULT - HISTORY OF PRESENT ILLNESS
60 y/o female with past medical hx of Spinal Stenosis, Obesity, GERD and Abdominal Aortic Aneurism present today at the ED c/o severe abdominal pain that started 3 days ago with moderate to severe colic abdominal pain associated with nausea, vomiting and diarrhea, Patient was seen in the ED on November 3 where she was diagnosed with colitis of the distal transverse colon descending colon and sigmoid colon.  At that point time she got pain medication antibiotics.  She was offered admission but decided she wanted to go home. Patient stated she had a root canal procedure and  had fever and sore throat a week ago and was taking Amoxicillin a week before her symptoms started. Patient currently with Colic abdominal pain and nausea, denies fever, chills, sore throat, teeth pain, vomiting, diarrhea.  In the ED her vitals sign were BP: 120/80, HR:100, RR:18, SatO2:93, Temp:97.9, Labs were pertinent for: WBC Count: 14.47: CT Abdomen showed: Mild colitis involving the mid to distal transverse colon, descending   colon, and sigmoid colon , Patient is admitted to the floor for further work up

## 2024-11-05 NOTE — H&P ADULT - NSHPPHYSICALEXAM_GEN_ALL_CORE
GENERAL: NAD, lying in bed   HEAD:  Atraumatic, normocephalic  EYES: EOMI, conjunctiva and sclera clear. Mild dry mouth   NECK: Supple, trachea midline, no JVD  HEART: Regular rate and rhythm, no murmurs, rubs, or gallops  LUNGS: Unlabored respirations.  Clear to auscultation bilaterally, no crackles, wheezing, or rhonchi  ABDOMEN: Soft, Tender and rebound noted in the right lateral and upper abdomen.   EXTREMITIES: 2+ peripheral pulses bilaterally. No clubbing, cyanosis, or edema  NERVOUS SYSTEM:  A&Ox3, moving all extremities, no focal deficits   SKIN: No rashes or lesions

## 2024-11-05 NOTE — CHART NOTE - NSCHARTNOTEFT_GEN_A_CORE
This report was requested by: Dianne Felix | Reference #: [id number]    You have not added a SARAI number. Keeping your SARAI number(s) up to date on the My SARAI # tab will enable the separation of your prescriptions from others in the search results.    Practitioner Count: 3  Pharmacy Count: 3  Current Opioid Prescriptions: 1  Current Benzodiazepine Prescriptions: 1  Current Stimulant Prescriptions: 1      Patient Demographic Information (PDI)       PDI	First Name	Last Name	Birth Date	Gender	Street Address	[facility] Code  A	Yumiko	Lord[place]Lala	1965	Female	[address]	[place]	[address]  B	Yumiko	Lord-Lala	1965	Female	[address]	[place]	[address]  C	Yumiko	[place]vitale	1965	Female	[address]	[place]	[address]    Prescription Information      PDI Filter:    PDI	Current Rx	Drug Type	Rx Written	Rx Dispensed	Drug	Quantity	Days Supply  A	Y	O	10/11/2024	10/18/2024	oxycodone hcl (ir) 20 mg tab	100	30  Prescriber Name Clifton Hester MD  Prescriber SARAI # [id number]  Payment Method [facility]  A	N	O	09/20/2024	09/20/2024	oxycodone hcl (ir) 20 mg tab	100	30  Prescriber Name Clifton Hester MD  Prescriber SARAI # [id number]  Payment Method [facility]  A	N	O	08/23/2024	08/23/2024	oxycodone hcl (ir) 20 mg tab	100	30  Prescriber Name Clifton Hester MD  Prescriber SARAI # [id number]  Payment Method [facility]  A	N	O	07/26/2024	07/26/2024	oxycodone hcl (ir) 20 mg tab	100	30  Prescriber Name Clifton Hester MD  Prescriber SARAI # [id number]  Payment Method [facility]  A	N	O	06/28/2024	06/28/2024	oxycodone hcl (ir) 20 mg tab	100	30  Prescriber Name Clifton Hester MD  Prescriber SARAI # [id number]  Payment Method [facility]  A	N	O	05/31/2024	05/31/2024	oxycodone hcl (ir) 20 mg tab	100	30  Prescriber Name Clifton Hesetr MD  Prescriber SARAI # [id number]  Payment Method [facility]  A	N	O	05/03/2024	05/03/2024	oxycodone hcl (ir) 20 mg tab	100	30  Prescriber Name Clifton Hester MD  Prescriber SARAI # [id number]  Payment Method Insurance  [facility]  A	N	O	04/05/2024	04/05/2024	oxycodone hcl (ir) 20 mg tab	100	30  Prescriber Name Clifton Hester MD  Prescriber SARAI # [id number]  Payment Method Insurance  [facility]  A	N	O	03/08/2024	03/08/2024	oxycodone hcl (ir) 20 mg tab	100	30  Prescriber Name Clifton Hester MD  Prescriber SARAI # [id number]  Payment Method [facility]  A	N	O	02/09/2024	02/09/2024	oxycodone hcl (ir) 20 mg tab	100	25  Prescriber Name Clifton Hester MD  Prescriber SARAI # [id number]  Payment Method [facility]  A	N	O	01/12/2024	01/12/2024	oxycodone hcl (ir) 20 mg tab	100	25  Prescriber Name Clifton Hester MD  Prescriber SARAI # [id number]  Payment Method [facility]  A	N	O	12/15/2023	12/15/2023	oxycodone hcl (ir) 20 mg tab	100	25  Prescriber Name Clifton Hester MD  Prescriber SARAI # [id number]  Payment Method [facility]  A	N	O	11/21/2023	11/22/2023	oxycodone hcl (ir) 20 mg tab	79	25  Prescriber Name Clifton Hester MD  Prescriber SARAI # [id number]  Payment Method [facility]  A	N	O	11/17/2023	11/17/2023	oxycodone hcl (ir) 20 mg tab	21	7  Prescriber Name Clifton Hester MD  Prescriber SARAI # [id number]  Payment Method Insurance  [facility]  B	N	S	08/23/2024	08/23/2024	dextroamp-amphetamin 30 mg tab	60	30  Prescriber Name Diana Azar  Prescriber SARAI # [id number]  Payment Method Insurance  [facility] #[id number]  C	Y	B	10/18/2024	10/23/2024	clonazepam 2 mg tablet	60	30  Prescriber Name Reyes, John A MD  Prescriber SARAI # [id number]  Payment Method Insurance  [facility] #[id number]  C	Y	S	10/18/2024	10/18/2024	dextroamp-amphetamin 30 mg tab	60	30  Prescriber Name Reyes, John A MD  Prescriber SARAI # [id number]  Payment Method Insurance  [facility] #[id number]  C	N	S	06/28/2024	10/04/2024	phentermine 37.5 mg tablet	30	30  Prescriber Name Reyes, John A MD  Prescriber SARAI # [id number]  Payment Method Cash  [facility] #[id number]  C	N	B	09/20/2024	09/24/2024	clonazepam 2 mg tablet	60	30  Prescriber Name Reyes, John A MD  Prescriber SARAI # [id number]  Payment Method Insurance  [facility] #[id number]  C	N	S	09/20/2024	09/20/2024	dextroamp-amphetamin 30 mg tab	60	30  Prescriber Name Reyes, John A MD  Prescriber SARAI # [id number]  Payment Method Insurance  [facility] #[id number]  C	N	S	05/03/2024	09/02/2024	phentermine 37.5 mg tablet	30	30  Prescriber Name Reyes, John A MD  Prescriber SARAI # [id number]  Payment Method Cash  [facility] #[id number]  C	N	B	08/23/2024	08/25/2024	clonazepam 2 mg tablet	60	30  Prescriber Name Diana Azar  Prescriber SARAI # [id number]  Payment Method Insurance  [facility] #[id number]  C	N	S	06/28/2024	08/06/2024	phentermine 37.5 mg tablet	30	30  Prescriber Name Reyes, John A MD  Prescriber SARAI # [id number]  Payment Method Cash  [facility] #[id number]  C	N	S	07/26/2024	07/26/2024	dextroamp-amphetamin 30 mg tab	60	30  Prescriber Name Reyes, John A MD  Prescriber SARAI # [id number]  Payment Method Insurance  [facility] #[id number]  C	N	B	07/26/2024	07/26/2024	clonazepam 2 mg tablet	60	30  Prescriber Name Reyes, John A MD  Prescriber SARAI # [id number]  Payment Method Insurance  [facility] #[id number]  C	N	B	06/28/2024	06/29/2024	clonazepam 2 mg tablet	60	30  Prescriber Name Reyes, John A MD  Prescriber SARAI # [id number]  Payment Method Insurance  [facility] #[id number]  C	N	S	06/28/2024	06/28/2024	phentermine 37.5 mg tablet	30	30  Prescriber Name Reyes, John A MD  Prescriber SARAI # [id number]  Payment Method Cash  [facility] #[id number]  C	N	S	06/28/2024	06/28/2024	dextroamp-amphetamin 30 mg tab	60	30  Prescriber Name Reyes, John A MD  Prescriber SARAI # [id number]  Payment Method Insurance  [facility] #[id number]  C	N	B	05/29/2024	06/02/2024	clonazepam 2 mg tablet	60	30  Prescriber Name Reyes, John A MD  Prescriber SARAI # [id number]  Payment Method Insurance  [facility] #[id number]  C	N	S	05/29/2024	05/31/2024	dextroamp-amphetamin 30 mg tab	60	30  Prescriber Name Reyes, John A MD  Prescriber SARAI # [id number]  Payment Method Insurance  [facility] #[id number]  C	N	S	05/03/2024	05/04/2024	dextroamp-amphetamin 30 mg tab	60	30  Prescriber Name Reyes, John A MD  Prescriber SARAI # [id number]  Payment Method Insurance  [facility] #[id number]  C	N	B	05/03/2024	05/04/2024	clonazepam 2 mg tablet	60	30  Prescriber Name Reyes, John A MD  Prescriber SARAI # [id number]  Payment Method Insurance  [facility] #[id number]  C	N	S	05/03/2024	05/04/2024	phentermine 37.5 mg tablet	30	30  Prescriber Name Reyes, John A MD  Prescriber SARAI # [id number]  Payment Method Cash  [facility] #[id number]  C	N	S	04/04/2024	04/04/2024	dextroamp-amphetamin 30 mg tab	60	30  Prescriber Name Reyes, John A MD  Prescriber SARAI # [id number]  Payment Method Insurance  [facility] #[id number]  C	N	B	03/13/2024	03/16/2024	clonazepam 2 mg tablet	60	30  Prescriber Name Reyes, John A MD  Prescriber SARAI # [id number]  Payment Method Insurance  [facility] #[id number]  C	N	S	03/07/2024	03/08/2024	dextroamp-amphetamin 30 mg tab	60	30  Prescriber Name Reyes, John A MD  Prescriber SARAI # [id number]  Payment Method Insurance  [facility] #[id number]  C	N	S	02/09/2024	02/10/2024	dextroamp-amphetamin 30 mg tab	60	30  Prescriber Name Reyes, John A MD  Prescriber SARAI # [id number]  Payment Method Insurance  [facility] #[id number]  C	N	B	02/06/2024	02/07/2024	clonazepam 2 mg tablet	60	30  Prescriber Name Reyes, John A MD  Prescriber SARAI # [id number]  Payment Method Insurance  [facility] #[id number]  C	N	S	01/12/2024	01/13/2024	dextroamp-amphetamin 30 mg tab	60	30  Prescriber Name Reyes, John A MD  Prescriber SARAI # [id number]  Payment Method Insurance  [facility] #[id number]  C	N	S	12/21/2023	12/21/2023	dextroamp-amphetamin 15 mg tab	60	30  Prescriber Name Reyes, John A MD  Prescriber SARAI # [id number]  Payment Method Cash  [facility] #[id number]  C	N	B	12/21/2023	12/21/2023	clonazepam 2 mg tablet	60	30  Prescriber Name Reyes, John A MD  Prescriber SARAI # [id number]  Payment Method Insurance  [facility] #[id number]  C	N	B	11/17/2023	11/24/2023	clonazepam 2 mg tablet	60	30  Prescriber Name Reyes, John A MD  Prescriber SARAI # [id number]  Payment Method Insurance  [facility] #[id number]  C	N	S	11/21/2023	11/24/2023	dextroamp-amphetamin 15 mg tab	60	30  Prescriber Name Reyes, John A MD  Prescriber SARAI # [id number]  Payment Method Cash  [facility] #[id number]  C	N	S	11/17/2023	11/17/2023	dextroamp-amphetamin 15 mg tab	14	7  Prescriber Name Reyes, John A MD  Prescriber SARAI # [id number]  Payment Method Cash  [facility] #[id number]

## 2024-11-05 NOTE — PROGRESS NOTE ADULT - SUBJECTIVE AND OBJECTIVE BOX
CC: Patient is a 59y old  Female who presents with a chief complaint of Abdominal pain (2024 07:43)      Interval History:  Patient seen and examined at bedside.  No overnight events  C/O pain diffusely     ALLERGIES:  morphine (Unknown)    MEDICATIONS  (STANDING):  ciprofloxacin   IVPB 200 milliGRAM(s) IV Intermittent every 12 hours  clonazePAM  Tablet 2 milliGRAM(s) Oral two times a day  lactated ringers. 1000 milliLiter(s) (120 mL/Hr) IV Continuous <Continuous>  metroNIDAZOLE  IVPB 500 milliGRAM(s) IV Intermittent every 8 hours  oxyCODONE    IR 20 milliGRAM(s) Oral three times a day  pantoprazole    Tablet 40 milliGRAM(s) Oral before breakfast    MEDICATIONS  (PRN):    Vital Signs Last 24 Hrs  T(F): 98 (2024 06:00), Max: 98 (2024 02:30)  HR: 92 (2024 09:42) (88 - 100)  BP: 92/56 (2024 09:42) (92/56 - 126/76)  RR: 14 (2024 09:42) (14 - 18)  SpO2: 92% (2024 09:42) (92% - 99%)  I&O's Summary    BMI (kg/m2): 29 (-24 @ 00:41)    PHYSICAL EXAM:  GENERAL: NAD  NERVOUS SYSTEM:  CN II - XII intact; Sensation intact; follows commands  CHEST/LUNG: Clear to percussion bilaterally; No rales, rhonchi, wheezing, or rubs; normal respiratory effort, no intercostal retractions  HEART: Regular rate and rhythm; No murmurs, rubs, or gallops; No pitting edema  ABDOMEN: Soft, Nontender, Nondistended; Bowel sounds present; No HSM or masses  MUSCULOSKELETAL/EXTREMITIES:  2+ Peripheral Pulses, No clubbing or digital cyanosis; FROM of extremities  PSYCH: Appropriate affect, Alert & Oriented x 3, Good Memory; Good insight    LABS:                        14.8   14.47 )-----------( 183      ( 2024 01:05 )             44.0           136  |  97  |  8   ----------------------------<  122  3.7   |  34  |  0.76    Ca    8.0      2024 01:05    TPro  6.4  /  Alb  2.6  /  TBili  0.5  /  DBili  x   /  AST  15  /  ALT  15  /  AlkPhos  91    Lactate, Blood: 1.3 mmol/L ( @ 01:05)    Urinalysis Basic - ( 2024 01:05 )  Color: Yellow / Appearance: Clear / S.012 / pH: x  Gluc: 122 mg/dL / Ketone: Negative mg/dL  / Bili: Negative / Urobili: 0.2 mg/dL   Blood: x / Protein: Negative mg/dL / Nitrite: Negative   Leuk Esterase: Negative / RBC: 10 /HPF / WBC 0 /HPF   Sq Epi: x / Non Sq Epi: x / Bacteria: Negative /HPF    Care Discussed with Consultants/Other Providers: Yes   CC: Patient is a 59y old  Female who presents with a chief complaint of Abdominal pain (2024 07:43)      Interval History:  Patient seen and examined at bedside.  No overnight events  C/O pain diffusely     ALLERGIES:  morphine (Unknown)    MEDICATIONS  (STANDING):  ciprofloxacin   IVPB 200 milliGRAM(s) IV Intermittent every 12 hours  clonazePAM  Tablet 2 milliGRAM(s) Oral two times a day  lactated ringers. 1000 milliLiter(s) (120 mL/Hr) IV Continuous <Continuous>  metroNIDAZOLE  IVPB 500 milliGRAM(s) IV Intermittent every 8 hours  oxyCODONE    IR 20 milliGRAM(s) Oral three times a day  pantoprazole    Tablet 40 milliGRAM(s) Oral before breakfast    MEDICATIONS  (PRN):    Vital Signs Last 24 Hrs  T(F): 98 (2024 06:00), Max: 98 (2024 02:30)  HR: 92 (2024 09:42) (88 - 100)  BP: 92/56 (2024 09:42) (92/56 - 126/76)  RR: 14 (2024 09:42) (14 - 18)  SpO2: 92% (2024 09:42) (92% - 99%)  I&O's Summary    BMI (kg/m2): 29 (- @ 00:41)    PHYSICAL EXAM:  GENERAL: NAD  NERVOUS SYSTEM:  CN II - XII intact; Sensation intact; follows commands  CHEST/LUNG: Clear to percussion bilaterally; No rales, rhonchi, wheezing, or rubs; normal respiratory effort, no intercostal retractions  HEART: Regular rate and rhythm; No murmurs, rubs, or gallops; No pitting edema  ABDOMEN: Soft, Nontender, Nondistended; Bowel sounds present; No HSM or masses  MUSCULOSKELETAL/EXTREMITIES:  2+ Peripheral Pulses, No clubbing or digital cyanosis; FROM of extremities  PSYCH: Appropriate affect, Arousable    LABS:                        14.8   14.47 )-----------( 183      ( 2024 01:05 )             44.0           136  |  97  |  8   ----------------------------<  122  3.7   |  34  |  0.76    Ca    8.0      2024 01:05    TPro  6.4  /  Alb  2.6  /  TBili  0.5  /  DBili  x   /  AST  15  /  ALT  15  /  AlkPhos  91    Lactate, Blood: 1.3 mmol/L ( @ 01:05)    Urinalysis Basic - ( 2024 01:05 )  Color: Yellow / Appearance: Clear / S.012 / pH: x  Gluc: 122 mg/dL / Ketone: Negative mg/dL  / Bili: Negative / Urobili: 0.2 mg/dL   Blood: x / Protein: Negative mg/dL / Nitrite: Negative   Leuk Esterase: Negative / RBC: 10 /HPF / WBC 0 /HPF   Sq Epi: x / Non Sq Epi: x / Bacteria: Negative /HPF    Care Discussed with Consultants/Other Providers: Yes

## 2024-11-05 NOTE — ED PROVIDER NOTE - CLINICAL SUMMARY MEDICAL DECISION MAKING FREE TEXT BOX
59-year-old female with no colitis.  Does involve a relatively significant part of the colon.  With this being the case I do believe that IV antibiotics is appropriate.  The fact that continued pain despite attempting outpatient management she would benefit from inpatient management at this time.  After discussion with the hospitalist we will get a repeat CAT scan to ensure that there is no significant progression of disease.  Patient will be treated with Dilaudid for the degree of pain.

## 2024-11-05 NOTE — ED ADULT NURSE NOTE - OBJECTIVE STATEMENT
Patient received A&Ox4, ambulatory with steady gait at the present. Patient complains of abdominal pain since Sunday. Patient was evaluated in the ED & denied admission. Patient now back in ED with same complains and no relief from discharge meds. Endorses nausea & difficulty tolerating PO intake. Denies chest pain, SOB, headache, dizziness. Side rails up, call light in reach, safety maintained, comfort measures provided and MD evaluation in progress.

## 2024-11-05 NOTE — ED PROVIDER NOTE - PHYSICAL EXAMINATION
Vitals: I have reviewed the patients vital signs  General: nontoxic appearing  HEENT: Atraumatic, normocephalic, airway patent  Eyes: EOMI, tracking appropriately  Neck: no tracheal deviation  Chest/Lungs: no trauma, symmetric chest rise, speaking in complete sentences,  no resp distress  Heart: skin and extremities well perfused, regular rate and rhythm  Neuro: A+Ox3, appears non focal  MSK: no deformities  Skin: no cyanosis, no jaundice   Psych:  Normal mood and affect  Abdomen: Soft tender to palpation throughout no rebound no guarding

## 2024-11-05 NOTE — CONSULT NOTE ADULT - NS ATTEND AMEND GEN_ALL_CORE FT
Agree with assessment and plan as above.    58 y/o F with previously diagnosed colitis 3 days ago but was discharged. Notably, took amoxicillin for a URI recently. Currently on cipro and flagyl. Stool studies to rule out infectious etiology and c diff.     >75 minutes was spent on direct patient care

## 2024-11-05 NOTE — H&P ADULT - ASSESSMENT
60 y/o female with past medical hx of Spinal Stenosis, Obesity, GERD and Abdominal Aortic Aneurism admitted for Colitis    #Colitis vs Enteritis  -s/p Oral Cipro and Flagyl oupt  -IV fluids: 120ml/h Ringer Lactate  -c/w IV Ciprofloxacin  -c/w IV flagyl  -c/w Dilaudid for pain   -Diet: Clear liquids, advance as tolerated  -CTA: Mild Colitis  - GI PCR  -Ova and Parasite Stool  - C diff  -Stool cultures       #GERD  c/w home omeprazole 40mg    #Spinal Stenosis  -hold home Oxycodone     #Abdominal Aortic Aneurism  -Pt schedule for surgery in December    #dvt ppx  early ambulation    #GOC  -Full code   60 y/o female with past medical hx of Spinal Stenosis, Obesity, GERD and Abdominal Aortic Aneurism admitted for Colitis    #Colitis vs Enteritis  -s/p Oral Cipro and Flagyl oupt  -s/p Ciprofloxacin and flagyl in the ED  -s/p Dilaudid in the ED  -IV fluids: 120ml/h Ringer Lactate  -c/w IV Ciprofloxacin  -c/w IV flagyl  -c/w home oxycodone for pain   -Diet: Clear liquids, advance as tolerated  -CTA: Mild Colitis  - GI PCR  -Ova and Parasite Stool  - C diff  -Stool cultures       #GERD  c/w home omeprazole 40mg    #Spinal Stenosis  -c/w home  Oxycodone     #Abdominal Aortic Aneurism  -Pt schedule for surgery in December    #dvt ppx  early ambulation    #GOC  -Full code

## 2024-11-05 NOTE — PROGRESS NOTE ADULT - ASSESSMENT
58 y/o female with past medical hx of Spinal Stenosis, Obesity, GERD and Abdominal Aortic Aneurism admitted for Colitis    #Colitis vs Enteritis  -s/p Oral Cipro and Flagyl oupt  -s/p Ciprofloxacin and flagyl in the ED  -s/p Dilaudid in the ED  -IV fluids: 120ml/h Ringer Lactate  -c/w IV Ciprofloxacin  -c/w IV flagyl  -c/w home oxycodone for pain   -Diet: Clear liquids, advance as tolerated  -CTA: Mild Colitis  - GI PCR  -Ova and Parasite Stool  - C diff  -Stool cultures       #GERD  c/w home omeprazole 40mg    #Spinal Stenosis  -c/w home  Oxycodone     #Abdominal Aortic Aneurism  -Pt schedule for surgery in December    #dvt ppx  early ambulation    #GOC  -Full code   58 y/o female with past medical hx of Spinal Stenosis, Obesity, GERD and Abdominal Aortic Aneurism admitted for Colitis    #Intractable abdominal pain possibly due to Colitis vs Enteritis  -s/p Oral Cipro and Flagyl oupt but failed therapy  -s/p Ciprofloxacin and flagyl, and Dilaudid in the ED  -IV fluids: 120ml/h Ringer Lactate  -c/w IV Ciprofloxacin and IV flagyl  -c/w home oxycodone for pain   -Diet: Clear liquids, advance as tolerated  -CTA: Mild Colitis  - GI PCR  -Ova and Parasite Stool  - C diff  -Stool cultures       #GERD  c/w home omeprazole 40mg    #Spinal Stenosis  -c/w home  Oxycodone     #Abdominal Aortic Aneurism  -Pt schedule for surgery in December    #dvt ppx  early ambulation    #GOC  -Full code   58 y/o female with past medical hx of Spinal Stenosis, Obesity, GERD and Abdominal Aortic Aneurism admitted for Colitis    #Intractable abdominal pain possibly due to Colitis vs Enteritis  -s/p Oral Cipro and Flagyl oupt but failed therapy  -s/p Ciprofloxacin and flagyl, and Dilaudid in the ED  -IV fluids: 120ml/h Ringer Lactate  -c/w IV Ciprofloxacin and IV flagyl  -c/w home oxycodone for pain   -Diet: Clear liquids, advance as tolerated  -CTA: Mild Colitis  - GI PCR, O&P, C diff ordered, and Stool cultures (Patient reports minimal)    #GERD  c/w home omeprazole 40mg    #Spinal Stenosis  -c/w home  Oxycodone continued    #Abdominal Aortic Aneurism  -Pt schedule for surgery in December    #dvt ppx  early ambulation    #GOC  -Full code   60 y/o female with past medical hx of Spinal Stenosis, Obesity, GERD and Abdominal Aortic Aneurism admitted for Colitis    #Intractable abdominal pain possibly due to Colitis vs Enteritis  -s/p Oral Cipro and Flagyl oupt but failed therapy  -s/p Ciprofloxacin and flagyl, and Dilaudid in the ED  -IV fluids: 120ml/h Ringer Lactate  -c/w IV Ciprofloxacin and IV flagyl  -c/w home oxycodone for pain   -Diet: Clear liquids, advance as tolerated  -CTA: Mild Colitis  - GI PCR, O&P, C diff ordered, and Stool cultures (Patient reports minimal)    #GERD  c/w home omeprazole 40mg    #Spinal Stenosis  -c/w home  Oxycodone continued    #Abdominal Aortic Aneurism  -Pt schedule for surgery in December    #dvt ppx  early ambulation    #GOC  -Full code    Left message for  José Miguel on cell 11/5

## 2024-11-05 NOTE — H&P ADULT - ATTENDING COMMENTS
I have personally seen and examined patient on the above date.  I discussed the case with Dr Thurston and I agree with findings and plan as detailed per note above, which I have amended where appropriate.    Superseding the above.  59F hx of AAA (to be scheduled for endovascular intervention in 12/2024), HTN, Hiatal hernia, chronic back pain on chronic opioids and benzos pw abd pain and colitis. Pt was in ED 2 days ago and had presented with sxs of fever and sore throat and took 1/2 pill of amoxicillin with subsequent severe vomiting, nausea associated with abd pain and diarrhea. CTAP at the time with transvere, descending and sigmoid colitis and was D/C on cipro and Flagyl. Since return home with recurrent abd pain with nausea and while able to tolerate liquids was unable to tolerate food. Diarrhea has subsided. No further fevers. Related was on amoxicillin over 2 weeks ago for dental infection.   Meds; Omeprazole, cipro, flagyl, oxycodone 20mg tid. clonazepam 2mg bid(see istop)  NKDA  Sochx: long standing smoker 1 and 1./2 ppd , denied ETOH or illict drug use.   PE: afebrile P: 100 BP: 120/80 sat 93 % on RA now P: 95 sat 97% on RA  mild distress  HEENT: dry MMM  CV: S1S2, RRR, no mgr  CL: CTA bl   Abd: soft, mild distention, diffuse abd tenderness on palp with some rebound. no rigidity  Ext; neg CCE  Labs;   WBC: 14.47 hgb: 14.8 85% N lact: 1.3 Na: 136 CO2: 35 BUN/cr: 8/0.76  EKG: personally rev. NSR at 94bpm no acute st changes  CTAP:  IMPRESSION:  Mild colitis involving the mid to distal transverse colon, descending colon, and sigmoid colon similar to the prior exam. Colonic diverticulosis without evidence of diverticulitis.    Infrarenal abdominal aortic aneurysm with peripheral intramural thrombus measuring up to 4.5 cm in AP diameter and 5 cm in transverse diameter. Patient should have follow-up with vascular surgery.    AP: 59F hx of AAA (to be scheduled for endovascular intervention in 12/2024), HTN, Hiatal hernia, chronic back pain on chronic opioids and benzos pw abd pain and colitis.     #Colitis  No further diarrhea  Repeat CTAP with mild colitis in tranverse, descending and sigmoid colon.   Cont with IV Cipro and Flagyl for now.   check GI PCR, stool cx, o/p and C diff.   serial abd exams, if worsening sxs or rising wbc consider adding on po vanco to better cover for C diff.   GI consult.   #Chronic back pain   cont oxycodone baseline dose.   prn Dilaudid for breakthru  #Anxiety d/o  cont klonopin    [facility] rev  D/W Dr Ram ED.

## 2024-11-06 ENCOUNTER — TRANSCRIPTION ENCOUNTER (OUTPATIENT)
Age: 59
End: 2024-11-06

## 2024-11-06 VITALS
SYSTOLIC BLOOD PRESSURE: 134 MMHG | OXYGEN SATURATION: 92 % | TEMPERATURE: 99 F | HEART RATE: 91 BPM | DIASTOLIC BLOOD PRESSURE: 75 MMHG | RESPIRATION RATE: 16 BRPM

## 2024-11-06 LAB
ANION GAP SERPL CALC-SCNC: 5 MMOL/L — SIGNIFICANT CHANGE UP (ref 5–17)
BASOPHILS # BLD AUTO: 0.04 K/UL — SIGNIFICANT CHANGE UP (ref 0–0.2)
BASOPHILS NFR BLD AUTO: 0.3 % — SIGNIFICANT CHANGE UP (ref 0–2)
BUN SERPL-MCNC: 8 MG/DL — SIGNIFICANT CHANGE UP (ref 7–23)
C DIFF BY PCR RESULT: SIGNIFICANT CHANGE UP
CALCIUM SERPL-MCNC: 8 MG/DL — LOW (ref 8.4–10.5)
CHLORIDE SERPL-SCNC: 96 MMOL/L — SIGNIFICANT CHANGE UP (ref 96–108)
CO2 SERPL-SCNC: 34 MMOL/L — HIGH (ref 22–31)
CREAT SERPL-MCNC: 0.64 MG/DL — SIGNIFICANT CHANGE UP (ref 0.5–1.3)
EGFR: 103 ML/MIN/1.73M2 — SIGNIFICANT CHANGE UP
EOSINOPHIL # BLD AUTO: 0.09 K/UL — SIGNIFICANT CHANGE UP (ref 0–0.5)
EOSINOPHIL NFR BLD AUTO: 0.7 % — SIGNIFICANT CHANGE UP (ref 0–6)
GI PCR PANEL: SIGNIFICANT CHANGE UP
GLUCOSE SERPL-MCNC: 118 MG/DL — HIGH (ref 70–99)
HCT VFR BLD CALC: 40.2 % — SIGNIFICANT CHANGE UP (ref 34.5–45)
HGB BLD-MCNC: 13.3 G/DL — SIGNIFICANT CHANGE UP (ref 11.5–15.5)
IMM GRANULOCYTES NFR BLD AUTO: 0.6 % — SIGNIFICANT CHANGE UP (ref 0–0.9)
LYMPHOCYTES # BLD AUTO: 1.74 K/UL — SIGNIFICANT CHANGE UP (ref 1–3.3)
LYMPHOCYTES # BLD AUTO: 13.9 % — SIGNIFICANT CHANGE UP (ref 13–44)
MCHC RBC-ENTMCNC: 31.4 PG — SIGNIFICANT CHANGE UP (ref 27–34)
MCHC RBC-ENTMCNC: 33.1 G/DL — SIGNIFICANT CHANGE UP (ref 32–36)
MCV RBC AUTO: 95 FL — SIGNIFICANT CHANGE UP (ref 80–100)
MONOCYTES # BLD AUTO: 0.79 K/UL — SIGNIFICANT CHANGE UP (ref 0–0.9)
MONOCYTES NFR BLD AUTO: 6.3 % — SIGNIFICANT CHANGE UP (ref 2–14)
NEUTROPHILS # BLD AUTO: 9.75 K/UL — HIGH (ref 1.8–7.4)
NEUTROPHILS NFR BLD AUTO: 78.2 % — HIGH (ref 43–77)
NRBC # BLD: 0 /100 WBCS — SIGNIFICANT CHANGE UP (ref 0–0)
PLATELET # BLD AUTO: 194 K/UL — SIGNIFICANT CHANGE UP (ref 150–400)
POTASSIUM SERPL-MCNC: 3.4 MMOL/L — LOW (ref 3.5–5.3)
POTASSIUM SERPL-SCNC: 3.4 MMOL/L — LOW (ref 3.5–5.3)
RBC # BLD: 4.23 M/UL — SIGNIFICANT CHANGE UP (ref 3.8–5.2)
RBC # FLD: 13 % — SIGNIFICANT CHANGE UP (ref 10.3–14.5)
SODIUM SERPL-SCNC: 135 MMOL/L — SIGNIFICANT CHANGE UP (ref 135–145)
WBC # BLD: 12.49 K/UL — HIGH (ref 3.8–10.5)
WBC # FLD AUTO: 12.49 K/UL — HIGH (ref 3.8–10.5)

## 2024-11-06 PROCEDURE — 81001 URINALYSIS AUTO W/SCOPE: CPT

## 2024-11-06 PROCEDURE — 87045 FECES CULTURE AEROBIC BACT: CPT

## 2024-11-06 PROCEDURE — 87177 OVA AND PARASITES SMEARS: CPT

## 2024-11-06 PROCEDURE — 87493 C DIFF AMPLIFIED PROBE: CPT

## 2024-11-06 PROCEDURE — 99239 HOSP IP/OBS DSCHRG MGMT >30: CPT

## 2024-11-06 PROCEDURE — 80048 BASIC METABOLIC PNL TOTAL CA: CPT

## 2024-11-06 PROCEDURE — 99232 SBSQ HOSP IP/OBS MODERATE 35: CPT

## 2024-11-06 PROCEDURE — 87046 STOOL CULTR AEROBIC BACT EA: CPT

## 2024-11-06 PROCEDURE — 99285 EMERGENCY DEPT VISIT HI MDM: CPT | Mod: 25

## 2024-11-06 PROCEDURE — 80053 COMPREHEN METABOLIC PANEL: CPT

## 2024-11-06 PROCEDURE — 93005 ELECTROCARDIOGRAM TRACING: CPT

## 2024-11-06 PROCEDURE — 83605 ASSAY OF LACTIC ACID: CPT

## 2024-11-06 PROCEDURE — 83690 ASSAY OF LIPASE: CPT

## 2024-11-06 PROCEDURE — 74177 CT ABD & PELVIS W/CONTRAST: CPT | Mod: MC

## 2024-11-06 PROCEDURE — 36415 COLL VENOUS BLD VENIPUNCTURE: CPT

## 2024-11-06 PROCEDURE — 85025 COMPLETE CBC W/AUTO DIFF WBC: CPT

## 2024-11-06 PROCEDURE — 87507 IADNA-DNA/RNA PROBE TQ 12-25: CPT

## 2024-11-06 RX ORDER — POTASSIUM CHLORIDE 10 MEQ
20 TABLET, EXTENDED RELEASE ORAL ONCE
Refills: 0 | Status: COMPLETED | OUTPATIENT
Start: 2024-11-06 | End: 2024-11-06

## 2024-11-06 RX ORDER — CIPROFLOXACIN LACTATE 200MG/20ML
1 VIAL (ML) INTRAVENOUS
Qty: 18 | Refills: 0
Start: 2024-11-06 | End: 2024-11-14

## 2024-11-06 RX ORDER — METRONIDAZOLE 250 MG/1
1 TABLET ORAL
Qty: 27 | Refills: 0
Start: 2024-11-06 | End: 2024-11-14

## 2024-11-06 RX ADMIN — METRONIDAZOLE 100 MILLIGRAM(S): 250 TABLET ORAL at 07:37

## 2024-11-06 RX ADMIN — HEPARIN SODIUM 5000 UNIT(S): 10000 INJECTION INTRAVENOUS; SUBCUTANEOUS at 06:06

## 2024-11-06 RX ADMIN — Medication 100 MILLIGRAM(S): at 08:51

## 2024-11-06 RX ADMIN — METRONIDAZOLE 100 MILLIGRAM(S): 250 TABLET ORAL at 13:13

## 2024-11-06 RX ADMIN — Medication 650 MILLIGRAM(S): at 06:06

## 2024-11-06 RX ADMIN — PANTOPRAZOLE SODIUM 40 MILLIGRAM(S): 40 TABLET, DELAYED RELEASE ORAL at 07:38

## 2024-11-06 RX ADMIN — Medication 20 MILLIEQUIVALENT(S): at 08:51

## 2024-11-06 NOTE — ED POST DISCHARGE NOTE - RESULT SUMMARY
CTAP shows hepatomegaly, hepatic steatosis, diverticulosis, and small hernias. also note is aortic aneurysm.

## 2024-11-06 NOTE — DISCHARGE NOTE NURSING/CASE MANAGEMENT/SOCIAL WORK - NSPROMEDSBROUGHTTOHOSP_GEN_A_NUR
no
[FreeTextEntry1] : will start weaning Depakote 125 mg sprinkles as follows: 1 sprinkle in am, 2 sprinkles in pm x 2 weeks then 1 sprinkle twice daily x 2 weeks then 1 sprinkle at bedtime for 2 weeks, then discontinue  seizure precautions explained

## 2024-11-06 NOTE — DISCHARGE NOTE NURSING/CASE MANAGEMENT/SOCIAL WORK - NSDCFUADDAPPT_GEN_ALL_CORE_FT
We made you a hospital followup appointment with Dr. Reyes on 11/13/24 at 9:00am in the [place] [facility].

## 2024-11-06 NOTE — DISCHARGE NOTE PROVIDER - NSDCFUADDAPPT_GEN_ALL_CORE_FT
We made you a hospital followup appointment with Dr. Reyes on 11/13/24 at 9:00am in the [place] Office.  APPTS ARE READY TO BE MADE: [X] YES    Best Family or Patient Contact (if needed):    Additional Information about above appointments (if needed):  1: Pedro within 2 weeks We made you a hospital followup appointment with Dr. Reyes on 11/13/24 at 9:00am in the [place] Office.  APPTS ARE READY TO BE MADE: [X] YES    Best Family or Patient Contact (if needed):    Additional Information about above appointments (if needed):  1: Pedro within 1 week

## 2024-11-06 NOTE — DISCHARGE NOTE PROVIDER - CARE PROVIDER_API CALL
Thom Vasquez  Gastroenterology  [address] [place] [address], [address]  [place] [address]  Phone: [phone]  Fax: [phone]  Follow Up Time:

## 2024-11-06 NOTE — DISCHARGE NOTE PROVIDER - NSDCCPCAREPLAN_GEN_ALL_CORE_FT
PRINCIPAL DISCHARGE DIAGNOSIS  Diagnosis: Acute colitis  Assessment and Plan of Treatment:   You came to the hospital due to nausea, vomiting, and diarrhea  You were diagnosed with Colitis  You were treated with IV antibiotics  You were prescribed the following antibiotics: Cipro and flagyl to continue at home. You last day to complete these antibiotics are Nov 14. Your diarrhea will resolve with treatment. Please take antibiotics with food. Also have yogurt twice a day OR take a probitotic.  You will need to follow up with your primary care physician for further management and GI for a follow up for your abdominal pain.  Discharging Provider:  Cyndie Guido MD  Contact Info: [phone] - Please call with any questions or concerns.

## 2024-11-06 NOTE — PROGRESS NOTE ADULT - PROBLEM SELECTOR PLAN 1
CT Abd Reviewed  Monitor stools  Continue IV antibiotics for now  Cdiff negative  Check GI PCR and O&P - pending   Clear liquid diet ok  Monitor electrolytes  Continue IV fluids.

## 2024-11-06 NOTE — DISCHARGE NOTE NURSING/CASE MANAGEMENT/SOCIAL WORK - NSDCPEFALRISK_GEN_ALL_CORE
For information on Fall & Injury Prevention, visit: [email or web address] OR  [email or web address] OR  https://www.cdc.gov/steadi/patient.html

## 2024-11-06 NOTE — DISCHARGE NOTE PROVIDER - HOSPITAL COURSE
HPI:  58 y/o female with past medical hx of Spinal Stenosis, Obesity, GERD and Abdominal Aortic Aneurism present today at the ED c/o severe abdominal pain that started 3 days ago with moderate to severe colic abdominal pain associated with nausea, vomiting and diarrhea, Patient was seen in the ED on November 3 where she was diagnosed with colitis of the distal transverse colon descending colon and sigmoid colon.  At that point time she got pain medication antibiotics.  She was offered admission but decided she wanted to go home. Patient stated she had a root canal procedure and  had fever and sore throat a week ago and was taking Amoxicillin a week before her symptoms started. Patient currently with Colic abdominal pain and nausea, denies fever, chills, sore throat, teeth pain, vomiting, diarrhea.  In the ED her vitals sign were BP: 120/80, HR:100, RR:18, SatO2:93, Temp:97.9, Labs were pertinent for: WBC Count: 14.47: CT Abdomen showed: Mild colitis involving the mid to distal transverse colon, descending   colon, and sigmoid colon , Patient is admitted to the floor for further work up    (05 Nov 2024 02:54)      Hospital Course Summary: Pt was given IV dilaudid and was lethargic. SHe was placed on clear liquid diet an dseen by GI. She was able to have her diet advanced and tolerated it. She still has pain but tolerable. She is stable for discharge with outpatient F/U.    No ongoing risks or concerns    Meds sent to her pharmacy    Source of Infection: Colitis  Antibiotic / Last Day: Last dya Nov 14    Discharging Provider:  Cyndie Guido MD  Contact Info: Cell [phone], email- [email or web address]    Outpatient Provider: Dr. Reyes - aware           HPI:  58 y/o female with past medical hx of Spinal Stenosis, Obesity, GERD and Abdominal Aortic Aneurism present today at the ED c/o severe abdominal pain that started 3 days ago with moderate to severe colic abdominal pain associated with nausea, vomiting and diarrhea, Patient was seen in the ED on November 3 where she was diagnosed with colitis of the distal transverse colon descending colon and sigmoid colon.  At that point time she got pain medication antibiotics.  She was offered admission but decided she wanted to go home. Patient stated she had a root canal procedure and  had fever and sore throat a week ago and was taking Amoxicillin a week before her symptoms started. Patient currently with Colic abdominal pain and nausea, denies fever, chills, sore throat, teeth pain, vomiting, diarrhea.  In the ED her vitals sign were BP: 120/80, HR:100, RR:18, SatO2:93, Temp:97.9, Labs were pertinent for: WBC Count: 14.47: CT Abdomen showed: Mild colitis involving the mid to distal transverse colon, descending   colon, and sigmoid colon , Patient is admitted to the floor for further work up    (05 Nov 2024 02:54)      Hospital Course Summary: Pt was given IV dilaudid and was lethargic. She was placed on clear liquid diet and seen by GI. She was able to have her diet advanced and tolerated it. She still has pain but tolerable. She is stable for discharge with outpatient F/U.    No ongoing risks or concerns    Meds sent to her pharmacy    Source of Infection: Colitis  Antibiotic / Last Day: Last day Nov 14    Discharging Provider:  Cyndie Guido MD  Contact Info: Cell [phone], email- [email or web address]    Outpatient Provider: Dr. Reyes - aware

## 2024-11-06 NOTE — ED POST DISCHARGE NOTE - DETAILS
inpatient note addresses aortic aneurysm showing pt has plan for surgery in December. Message sent to inpatient team including Cedric Zheng, and SHERRIE Pina. - Catrina FRANCISCO inpatient note addresses aortic aneurysm showing pt has plan for surgery in December. Message sent to inpatient team including Kaci Saleh (GI) and Cedric (hospitalist), and SHERRIE Pina. - Catrina FRANCISCO

## 2024-11-06 NOTE — DISCHARGE NOTE PROVIDER - NSDCMRMEDTOKEN_GEN_ALL_CORE_FT
Cipro 500 mg oral tablet: 1 tab(s) orally every 12 hours Please start this evening  clonazePAM 2 mg oral tablet: 1 tab(s) orally 2 times a day  metroNIDAZOLE 500 mg oral tablet: 1 tab(s) orally every 8 hours Please start this evening  omeprazole 20 mg oral delayed release capsule: 1 cap(s) orally once a day  oxyCODONE 20 mg oral tablet: 1 tab(s) orally 3 times a day

## 2024-11-06 NOTE — PROGRESS NOTE ADULT - SUBJECTIVE AND OBJECTIVE BOX
INTERVAL HPI/OVERNIGHT EVENTS:  HPI:    60 y/o female seen and examined, diarrhea persist. + abdominal pain      MEDICATIONS  (STANDING):  ciprofloxacin   IVPB 200 milliGRAM(s) IV Intermittent every 12 hours  heparin   Injectable 5000 Unit(s) SubCutaneous every 12 hours  metroNIDAZOLE  IVPB 500 milliGRAM(s) IV Intermittent every 8 hours  pantoprazole    Tablet 40 milliGRAM(s) Oral before breakfast  senna 2 Tablet(s) Oral at bedtime    MEDICATIONS  (PRN):  acetaminophen     Tablet .. 650 milliGRAM(s) Oral every 6 hours PRN Mild Pain (1 - 3)  clonazePAM  Tablet 2 milliGRAM(s) Oral two times a day PRN anxiety  HYDROmorphone  Injectable 0.25 milliGRAM(s) IV Push every 6 hours PRN Severe Pain (7 - 10)  ondansetron Injectable 4 milliGRAM(s) IV Push every 8 hours PRN Nausea and/or Vomiting  oxyCODONE    IR 20 milliGRAM(s) Oral three times a day PRN Moderate Pain (4 - 6)      Allergies    morphine (Unknown)    Intolerances        PAST MEDICAL & SURGICAL HISTORY:  Mental health problem      No significant past surgical history    PHYSICAL EXAM:   Vital Signs:  Vital Signs Last 24 Hrs  T(C): 36 (06 Nov 2024 06:30), Max: 37.7 (06 Nov 2024 01:42)  T(F): 96.8 (06 Nov 2024 06:30), Max: 99.9 (06 Nov 2024 01:42)  HR: 80 (06 Nov 2024 06:30) (80 - 104)  BP: 115/76 (06 Nov 2024 06:30) (115/76 - 122/74)  BP(mean): --  RR: 16 (06 Nov 2024 01:42) (15 - 16)  SpO2: 92% (06 Nov 2024 01:42) (87% - 92%)    Parameters below as of 06 Nov 2024 01:42  Patient On (Oxygen Delivery Method): room air      Daily     Daily I&O's Summary      GENERAL:  Appears stated age  HEENT:  NC/AT,  conjunctivae clear and pink,  CHEST:  Full & symmetric excursion, no increased effort, breath sounds clear  HEART:  Regular rhythm, S1, S2,  ABDOMEN:  Soft, non-tender, non-distended, normoactive bowel sounds,   EXTEREMITIES:  no  edema  SKIN:  No rash/warm/dry  NEURO:  Alert, oriented      LABS:                        13.3   12.49 )-----------( 194      ( 06 Nov 2024 05:43 )             40.2     11-06    135  |  96  |  8   ----------------------------<  118[H]  3.4[L]   |  34[H]  |  0.64    Ca    8.0[L]      06 Nov 2024 05:43    TPro  6.4  /  Alb  2.6[L]  /  TBili  0.5  /  DBili  x   /  AST  15  /  ALT  15  /  AlkPhos  91  11-05      Urinalysis Basic - ( 06 Nov 2024 05:43 )    Color: x / Appearance: x / SG: x / pH: x  Gluc: 118 mg/dL / Ketone: x  / Bili: x / Urobili: x   Blood: x / Protein: x / Nitrite: x   Leuk Esterase: x / RBC: x / WBC x   Sq Epi: x / Non Sq Epi: x / Bacteria: x      Lipase: 18 U/L (11-05 @ 01:05)  Lipase: 11 U/L (11-03 @ 11:18)    RADIOLOGY & ADDITIONAL TESTS:

## 2024-11-06 NOTE — PROGRESS NOTE ADULT - NS ATTEND AMEND GEN_ALL_CORE FT
I attest my time as attending was greater than 50% of the total time of 35 min on (ACP <=18 min) patient care on this DOS. Time spent includes review of hospital course, labs, vitals, medical records, patient evaluation, contact with family (when present), discussion of plan with the primary team, coordinating services and documenting encounter.
Pt significantly better today and tolerating diet. Has not used dilaudid PRN. Wants to go home for outpt follow up. Will give Abx for 10 days total. D/C planning

## 2024-11-06 NOTE — PROGRESS NOTE ADULT - SUBJECTIVE AND OBJECTIVE BOX
Patient is a 59y old  Female who presents with a chief complaint of Abdominal pain (05 Nov 2024 12:24)    Patient seen and examined at bedside.  S: remains with diarrhea     ALLERGIES:  morphine (Unknown)    MEDICATIONS:  acetaminophen     Tablet .. 650 milliGRAM(s) Oral every 6 hours PRN  ciprofloxacin   IVPB 200 milliGRAM(s) IV Intermittent every 12 hours  clonazePAM  Tablet 2 milliGRAM(s) Oral two times a day PRN  heparin   Injectable 5000 Unit(s) SubCutaneous every 12 hours  HYDROmorphone  Injectable 0.25 milliGRAM(s) IV Push every 6 hours PRN  lactated ringers. 1000 milliLiter(s) IV Continuous <Continuous>  metroNIDAZOLE  IVPB 500 milliGRAM(s) IV Intermittent every 8 hours  ondansetron Injectable 4 milliGRAM(s) IV Push every 8 hours PRN  oxyCODONE    IR 20 milliGRAM(s) Oral three times a day PRN  pantoprazole    Tablet 40 milliGRAM(s) Oral before breakfast  senna 2 Tablet(s) Oral at bedtime    Vital Signs Last 24 Hrs  T(F): 96.8 (06 Nov 2024 06:30), Max: 99.9 (06 Nov 2024 01:42)  HR: 80 (06 Nov 2024 06:30) (80 - 104)  BP: 115/76 (06 Nov 2024 06:30) (109/70 - 122/74)  RR: 16 (06 Nov 2024 01:42) (14 - 16)  SpO2: 92% (06 Nov 2024 01:42) (86% - 92%)  I&O's Summary      PHYSICAL EXAM:  General: NAD, A/O x 3  ENT: MMM  Lungs: Clear to auscultation bilaterally   Cardio: RR, S1/S2, No murmurs  Abdomen: Soft, NT/ND, Normal active Bowel Sounds   Extremities: No cyanosis, No edema      LABS:                        13.3   12.49 )-----------( 194      ( 06 Nov 2024 05:43 )             40.2     11-06    135  |  96  |  8   ----------------------------<  118  3.4   |  34  |  0.64    Ca    8.0      06 Nov 2024 05:43    TPro  6.4  /  Alb  2.6  /  TBili  0.5  /  DBili  x   /  AST  15  /  ALT  15  /  AlkPhos  91  11-05      Lactate, Blood: 1.3 mmol/L (11-05 @ 01:05)      Urinalysis Basic - ( 06 Nov 2024 05:43 )  Color: x / Appearance: x / SG: x / pH: x  Gluc: 118 mg/dL / Ketone: x  / Bili: x / Urobili: x   Blood: x / Protein: x / Nitrite: x   Leuk Esterase: x / RBC: x / WBC x   Sq Epi: x / Non Sq Epi: x / Bacteria: x        RADIOLOGY & ADDITIONAL TESTS:  Mild colitis involving the mid to distal transverse colon, descending   colon, and sigmoid colon similar to the prior exam. Colonic   diverticulosis without evidence of diverticulitis.    Infrarenal abdominal aortic aneurysm with peripheral intramural thrombus   measuring up to 4.5 cm in AP diameter and 5 cm in transverse diameter.   Patient should have follow-up with vascular surgery.    < from: Xray Chest 1 View AP/PA (11.03.24 @ 11:25) >  Unremarkable frontal chest x ray      Care Discussed with Consultants/Other Providers:   SHERRIE Pian discussed case and plan with Dr. Guido

## 2024-11-06 NOTE — DISCHARGE NOTE PROVIDER - NSDCFUSCHEDAPPT_GEN_ALL_CORE_FT
Rob Enriquez  [facility]  Scheduled Appointment: 11/11/2024    Reyes, John A  [place] [facility]  [address]  Scheduled Appointment: 11/13/2024    Josse Lechuga  [place] [facility]  CARDIOLOGY [address]   Scheduled Appointment: 11/14/2024    Rob Enriquez  [facility] PreAdmits  Scheduled Appointment: 12/02/2024

## 2024-11-06 NOTE — PROGRESS NOTE ADULT - ASSESSMENT
60yo female with PMH Spinal Stenosis, Obesity, GERD and Abdominal Aortic Aneurism admitted for Colitis.     *Abdominal pain possibly due to Colitis vs Enteritis  -CTAP: Mild Colitis  -s/p Oral Cipro and Flagyl oupt -failed therapy  -s/p Ciprofloxacin and flagyl, and Dilaudid in the ED  -IV fluids: 120ml/h Ringer Lactate  -Cont IV Ciprofloxacin and IV flagyl  -c/w home Oxycodone for pain &D Dilaudid PRN  -Diet: Advance to full liquids   - C-Diff neg  - f/up GI PCR, O&P, and Stool cultures     *Infrarenal abdominal aortic aneurysm with peripheral intramural thrombus   - Per pt followed oupt- plan for surgical intervention in December    *GERD  -Cont home omeprazole 40mg    *Spinal Stenosis   Cont home Oxycodone     *DVT ppx  OOB, ambulation     *GOC/Code Status:  Full Code     Dispo: Pending clinical course as above.  Will update  José Miguel [phone] regarding pts current status and plan of care.    60yo female with PMH Spinal Stenosis, Obesity, GERD and Abdominal Aortic Aneurism admitted for Colitis.     *Abdominal pain possibly due to Colitis vs Enteritis  -CTAP: Mild Colitis  -s/p Oral Cipro and Flagyl oupt -failed therapy  -s/p Ciprofloxacin and flagyl, and Dilaudid in the ED  -IV fluids: 120ml/h Ringer Lactate  -Cont IV Ciprofloxacin and IV flagyl  -c/w home Oxycodone for pain &D Dilaudid PRN  -Diet: Advance to full liquids   - C-Diff neg  - f/up GI PCR, O&P, and Stool cultures     *Infrarenal abdominal aortic aneurysm with peripheral intramural thrombus   - Per pt followed oupt- plan for surgical intervention in December    *GERD  -Cont home omeprazole 40mg    *Spinal Stenosis   Cont home Oxycodone     *DVT ppx  OOB, ambulation     *GOC/Code Status:  Full Code     Dispo: home if can tolerate diet   José Miguel [phone]

## 2024-11-06 NOTE — DISCHARGE NOTE NURSING/CASE MANAGEMENT/SOCIAL WORK - PATIENT PORTAL LINK FT
You can access the FollowMyHealth Patient Portal offered by [facility] by registering at the following website: [email or web address]. By joining [facility]’s FollowMyHealth portal, you will also be able to view your health information using other applications (apps) compatible with our system.

## 2024-11-06 NOTE — DISCHARGE NOTE NURSING/CASE MANAGEMENT/SOCIAL WORK - FINANCIAL ASSISTANCE
[facility] provides services at a reduced cost to those who are determined to be eligible through [facility]’s financial assistance program. Information regarding [facility]’s financial assistance program can be found by going to [email or web address] or by calling [phone].

## 2024-11-07 LAB
CULTURE RESULTS: SIGNIFICANT CHANGE UP
SPECIMEN SOURCE: SIGNIFICANT CHANGE UP

## 2024-11-07 RX ORDER — ONDANSETRON HYDROCHLORIDE 2 MG/ML
1 INJECTION, SOLUTION INTRAMUSCULAR; INTRAVENOUS
Qty: 20 | Refills: 0
Start: 2024-11-07 | End: 2024-11-16

## 2024-11-08 LAB
CULTURE RESULTS: ABNORMAL
SPECIMEN SOURCE: SIGNIFICANT CHANGE UP

## 2024-11-11 ENCOUNTER — OUTPATIENT (OUTPATIENT)
Dept: OUTPATIENT SERVICES | Facility: HOSPITAL | Age: 59
LOS: 1 days | End: 2024-11-11
Payer: COMMERCIAL

## 2024-11-11 VITALS
TEMPERATURE: 99 F | HEART RATE: 87 BPM | OXYGEN SATURATION: 94 % | WEIGHT: 184.09 LBS | SYSTOLIC BLOOD PRESSURE: 114 MMHG | HEIGHT: 66 IN | DIASTOLIC BLOOD PRESSURE: 81 MMHG | RESPIRATION RATE: 16 BRPM

## 2024-11-11 DIAGNOSIS — I71.40 ABDOMINAL AORTIC ANEURYSM, WITHOUT RUPTURE, UNSPECIFIED: ICD-10-CM

## 2024-11-11 DIAGNOSIS — Z01.818 ENCOUNTER FOR OTHER PREPROCEDURAL EXAMINATION: ICD-10-CM

## 2024-11-11 DIAGNOSIS — Z29.9 ENCOUNTER FOR PROPHYLACTIC MEASURES, UNSPECIFIED: ICD-10-CM

## 2024-11-11 LAB
ANION GAP SERPL CALC-SCNC: 16 MMOL/L — SIGNIFICANT CHANGE UP (ref 5–17)
BLD GP AB SCN SERPL QL: NEGATIVE — SIGNIFICANT CHANGE UP
BUN SERPL-MCNC: 17 MG/DL — SIGNIFICANT CHANGE UP (ref 7–23)
CALCIUM SERPL-MCNC: 9.6 MG/DL — SIGNIFICANT CHANGE UP (ref 8.4–10.5)
CHLORIDE SERPL-SCNC: 97 MMOL/L — SIGNIFICANT CHANGE UP (ref 96–108)
CO2 SERPL-SCNC: 23 MMOL/L — SIGNIFICANT CHANGE UP (ref 22–31)
CREAT SERPL-MCNC: 0.71 MG/DL — SIGNIFICANT CHANGE UP (ref 0.5–1.3)
EGFR: 99 ML/MIN/1.73M2 — SIGNIFICANT CHANGE UP
GLUCOSE SERPL-MCNC: 115 MG/DL — HIGH (ref 70–99)
HCT VFR BLD CALC: 49.8 % — HIGH (ref 34.5–45)
HGB BLD-MCNC: 16.4 G/DL — HIGH (ref 11.5–15.5)
MCHC RBC-ENTMCNC: 31.4 PG — SIGNIFICANT CHANGE UP (ref 27–34)
MCHC RBC-ENTMCNC: 32.9 G/DL — SIGNIFICANT CHANGE UP (ref 32–36)
MCV RBC AUTO: 95.4 FL — SIGNIFICANT CHANGE UP (ref 80–100)
NRBC # BLD: 0 /100 WBCS — SIGNIFICANT CHANGE UP (ref 0–0)
PLATELET # BLD AUTO: 414 K/UL — HIGH (ref 150–400)
POTASSIUM SERPL-MCNC: 3.8 MMOL/L — SIGNIFICANT CHANGE UP (ref 3.5–5.3)
POTASSIUM SERPL-SCNC: 3.8 MMOL/L — SIGNIFICANT CHANGE UP (ref 3.5–5.3)
RBC # BLD: 5.22 M/UL — HIGH (ref 3.8–5.2)
RBC # FLD: 13.5 % — SIGNIFICANT CHANGE UP (ref 10.3–14.5)
RH IG SCN BLD-IMP: POSITIVE — SIGNIFICANT CHANGE UP
SODIUM SERPL-SCNC: 136 MMOL/L — SIGNIFICANT CHANGE UP (ref 135–145)
WBC # BLD: 13.94 K/UL — HIGH (ref 3.8–10.5)
WBC # FLD AUTO: 13.94 K/UL — HIGH (ref 3.8–10.5)

## 2024-11-11 PROCEDURE — G0463: CPT

## 2024-11-11 PROCEDURE — 86850 RBC ANTIBODY SCREEN: CPT

## 2024-11-11 PROCEDURE — 85027 COMPLETE CBC AUTOMATED: CPT

## 2024-11-11 PROCEDURE — 86900 BLOOD TYPING SEROLOGIC ABO: CPT

## 2024-11-11 PROCEDURE — 86901 BLOOD TYPING SEROLOGIC RH(D): CPT

## 2024-11-11 PROCEDURE — 80048 BASIC METABOLIC PNL TOTAL CA: CPT

## 2024-11-11 RX ORDER — OXYCODONE HYDROCHLORIDE 30 MG/1
1 TABLET ORAL
Refills: 0 | DISCHARGE

## 2024-11-11 RX ORDER — OMEPRAZOLE 10 MG
1 CAPSULE,DELAYED RELEASE (ENTERIC COATED) ORAL
Refills: 0 | DISCHARGE

## 2024-11-11 RX ORDER — CLONAZEPAM 1 MG
1 TABLET ORAL
Refills: 0 | DISCHARGE

## 2024-11-11 NOTE — H&P PST ADULT - PROBLEM SELECTOR PLAN 2
The Caprini score indicates this patient is at risk for a VTE event (score 3-6).  Most surgical patients in this group would benefit from pharmacologic prophylaxis.  The surgical team will determine the balance between VTE risk and bleeding risk

## 2024-11-11 NOTE — H&P PST ADULT - OTHER CARE PROVIDERS
Cards. Dr. Lechuga [phone] for card clearance on 11/14/24; Pain management -Dr. Clifton Hester [phone]. Last visit 10/2024

## 2024-11-11 NOTE — H&P PST ADULT - PROBLEM SELECTOR PLAN 1
Scheduled Endovascular abdominal aneurysm repair on 12/2/24 with .   Pre-operative instructions and chlorhexidine given.  Labs: cbc, bmp, T/S, drawn in PST

## 2024-11-11 NOTE — H&P PST ADULT - NSANTHOSAYNRD_GEN_A_CORE
No. ADRIANNA screening performed.  STOP BANG Legend: 0-2 = LOW Risk; 3-4 = INTERMEDIATE Risk; 5-8 = HIGH Risk

## 2024-11-11 NOTE — H&P PST ADULT - NSICDXPASTMEDICALHX_GEN_ALL_CORE_FT
PAST MEDICAL HISTORY:  Mental health problem      PAST MEDICAL HISTORY:  Abdominal aortic aneurysm without rupture     Anxiety     Current smoker     GERD (gastroesophageal reflux disease)

## 2024-11-11 NOTE — H&P PST ADULT - HISTORY OF PRESENT ILLNESS
60 y/o female with past medical hx of Spinal Stenosis, Obesity, GERD and  Abdominal Aortic Aneurism. Recent ER visit at [facility] for adminal pain associated with  nausea, vomiting and diarrhea, treated for colitis. Presenting to PST prior to scheduled Endovascular abdominal aneurysm repair on 12/2/24 with .             present today at the ED c/o severe abdominal pain  that started 3 days ago with moderate to severe colic abdominal pain associated  with Patient was seen in the ED on November 3  where she was diagnosed with colitis of the distal transverse colon descending  colon and sigmoid colon.     60 y/o female with past medical hx of Spinal Stenosis, Obesity, GERD and  Abdominal Aortic Aneurism. Recent ER visit at [facility] for abdominal  pain associated with  nausea, vomiting and diarrhea, treated for colitis (discharged on Flagyl po last dose on 11/16/24).   Presenting to PST prior to scheduled Endovascular abdominal aneurysm repair on 12/2/24 with .     Endorses mild abdominal tenderness/soreness, constipation (on movantik), and Productive cough (clear-yellow) only at night for over a month. Had chest xray last week in ER (negative).    Denies any fever,chills, sick contact.  Denies nausea, vomiting, or diarrhea. Scheduled for cardiac clearance on 11/14/24 with Dr. Lechuga.        58 y/o female with past medical hx of Spinal Stenosis, Obesity, GERD and  Abdominal Aortic Aneurism. Recent ER visit at [facility] for abdominal  pain associated with  nausea, vomiting and diarrhea, treated for colitis (discharged on Flagyl po last dose on 11/16/24).   Presenting to PST prior to scheduled Endovascular abdominal aneurysm repair on 12/2/24 with .     Endorses mild abdominal tenderness/soreness, constipation (on movantik), and occasional productive cough (clear-yellow) only at night for over a month.PCP aware. Had chest xray last week in ER (negative). Knows to notify PCP if symptoms worsens.    Denies any fever,chills, sick contact.  Denies nausea, vomiting, or diarrhea. Scheduled for cardiac clearance on 11/14/24 with Dr. Lechuga.

## 2024-11-11 NOTE — H&P PST ADULT - ASSESSMENT
DASI score:  DASI activity:  Loose teeth or denture:denies   DASI score:  DASI activity:  H  Loose teeth or denture: denies   DASI score:7.25  DASI activity: grocery shopping, cooking cleaning. Can climb 1-2 flight of stairs without SOB/ chestpain   Loose teeth or denture: denies   DASI score:7.25  DASI activity: grocery shopping, cooking cleaning. Can climb 1-2 flight of stairs without SOB/ chestpain   Loose teeth or denture: denies      CAPRINI SCORE    AGE RELATED RISK FACTORS                                                             [x ] Age 41-60 years                                            (1 Point)  [ ] Age: 61-74 years                                           (2 Points)                 [ ] Age= 75 years                                                (3 Points)             DISEASE RELATED RISK FACTORS                                                       [ ] Edema in the lower extremities                 (1 Point)                     [ ] Varicose veins                                               (1 Point)                                 [x ] BMI > 25 Kg/m2                                            (1 Point)                                  [ ] Serious infection (ie PNA)                            (1 Point)                     [ ] Lung disease ( COPD, Emphysema)            (1 Point)                                                                          [ ] Acute myocardial infarction                         (1 Point)                  [ ] Congestive heart failure (in the previous month)  (1 Point)         [x ] Inflammatory bowel disease                            (1 Point)                  [ ] Central venous access, PICC or Port               (2 points)       (within the last month)                                                                [ ] Stroke (in the previous month)                        (5 Points)    [ ] Previous or present malignancy                       (2 points)                                                                                                                                                         HEMATOLOGY RELATED FACTORS                                                         [ ] Prior episodes of VTE                                     (3 Points)                     [ ] Positive family history for VTE                      (3 Points)                  [ ] Prothrombin 24771 A                                     (3 Points)                     [ ] Factor V Leiden                                                (3 Points)                        [ ] Lupus anticoagulants                                      (3 Points)                                                           [ ] Anticardiolipin antibodies                              (3 Points)                                                       [ ] High homocysteine in the blood                   (3 Points)                                             [ ] Other congenital or acquired thrombophilia      (3 Points)                                                [ ] Heparin induced thrombocytopenia                  (3 Points)                                        MOBILITY RELATED FACTORS  [ ] Bed rest                                                         (1 Point)  [ ] Plaster cast                                                    (2 points)  [ ] Bed bound for more than 72 hours           (2 Points)    GENDER SPECIFIC FACTORS  [ ] Pregnancy or had a baby within the last month   (1 Point)  [ ] Post-partum < 6 weeks                                   (1 Point)  [ ] Hormonal therapy  or oral contraception   (1 Point)  [ ] History of pregnancy complications              (1 point)  [ ] Unexplained or recurrent              (1 Point)    OTHER RISK FACTORS                                           (1 Point)  [x ] BMI >40, smoking, diabetes requiring insulin, chemotherapy  blood transfusions and length of surgery over 2 hours *2 points     SURGERY RELATED RISK FACTORS  [ ]  Section within the last month     (1 Point)  [ ] Minor surgery                                                  (1 Point)  [ ] Arthroscopic surgery                                       (2 Points)  [ ] Planned major surgery lasting more            (2 Points)      than 45 minutes     [ ] Elective hip or knee joint replacement       (5 points)       surgery                                                TRAUMA RELATED RISK FACTORS  [ ] Fracture of the hip, pelvis, or leg                       (5 Points)  [ ] Spinal cord injury resulting in paralysis             (5 points)       (in the previous month)    [ ] Paralysis  (less than 1 month)                             (5 Points)  [ ] Multiple Trauma within 1 month                        (5 Points)    Total Score [  5 ]    Caprini Score 0-2: Low Risk, NO VTE prophylaxis required for most patients, encourage ambulation  Caprini Score 3-6: Moderate Risk , pharmacologic VTE prophylaxis is indicated for most patients (in the absence of contraindications)  Caprini Score Greater than or =7: High risk, pharmocologic VTE prophylaxis indicated for most patients (in the absence of contraindications)

## 2024-11-14 ENCOUNTER — APPOINTMENT (OUTPATIENT)
Dept: CARDIOLOGY | Facility: CLINIC | Age: 59
End: 2024-11-14
Payer: COMMERCIAL

## 2024-11-14 ENCOUNTER — LABORATORY RESULT (OUTPATIENT)
Age: 59
End: 2024-11-14

## 2024-11-14 VITALS
TEMPERATURE: 97.8 F | RESPIRATION RATE: 16 BRPM | HEART RATE: 93 BPM | WEIGHT: 187 LBS | DIASTOLIC BLOOD PRESSURE: 80 MMHG | OXYGEN SATURATION: 97 % | HEIGHT: 66 IN | BODY MASS INDEX: 30.05 KG/M2 | SYSTOLIC BLOOD PRESSURE: 110 MMHG

## 2024-11-14 DIAGNOSIS — K21.9 GASTRO-ESOPHAGEAL REFLUX DISEASE W/OUT ESOPHAGITIS: ICD-10-CM

## 2024-11-14 DIAGNOSIS — E78.5 HYPERLIPIDEMIA, UNSPECIFIED: ICD-10-CM

## 2024-11-14 DIAGNOSIS — R06.02 SHORTNESS OF BREATH: ICD-10-CM

## 2024-11-14 DIAGNOSIS — E66.9 OBESITY, UNSPECIFIED: ICD-10-CM

## 2024-11-14 DIAGNOSIS — K52.9 NONINFECTIVE GASTROENTERITIS AND COLITIS, UNSPECIFIED: ICD-10-CM

## 2024-11-14 DIAGNOSIS — F41.1 GENERALIZED ANXIETY DISORDER: ICD-10-CM

## 2024-11-14 DIAGNOSIS — Z01.818 ENCOUNTER FOR OTHER PREPROCEDURAL EXAMINATION: ICD-10-CM

## 2024-11-14 DIAGNOSIS — Z13.228 ENCOUNTER FOR SCREENING FOR OTHER METABOLIC DISORDERS: ICD-10-CM

## 2024-11-14 DIAGNOSIS — R73.03 PREDIABETES.: ICD-10-CM

## 2024-11-14 DIAGNOSIS — I10 ESSENTIAL (PRIMARY) HYPERTENSION: ICD-10-CM

## 2024-11-14 DIAGNOSIS — I71.40 ABDOMINAL AORTIC ANEURYSM, WITHOUT RUPTURE, UNSPECIFIED: ICD-10-CM

## 2024-11-14 DIAGNOSIS — F17.200 NICOTINE DEPENDENCE, UNSPECIFIED, UNCOMPLICATED: ICD-10-CM

## 2024-11-14 DIAGNOSIS — I70.90 UNSPECIFIED ATHEROSCLEROSIS: ICD-10-CM

## 2024-11-14 PROCEDURE — 99204 OFFICE O/P NEW MOD 45 MIN: CPT

## 2024-11-14 PROCEDURE — G2211 COMPLEX E/M VISIT ADD ON: CPT

## 2024-11-14 PROCEDURE — 93000 ELECTROCARDIOGRAM COMPLETE: CPT

## 2024-11-14 RX ORDER — OXYCODONE 20 MG/1
20 TABLET ORAL
Refills: 0 | Status: ACTIVE | COMMUNITY
Start: 2024-11-14

## 2024-11-15 ENCOUNTER — APPOINTMENT (OUTPATIENT)
Dept: INTERNAL MEDICINE | Facility: CLINIC | Age: 59
End: 2024-11-15

## 2024-11-15 ENCOUNTER — RESULT REVIEW (OUTPATIENT)
Age: 59
End: 2024-11-15

## 2024-11-15 DIAGNOSIS — E87.1 HYPO-OSMOLALITY AND HYPONATREMIA: ICD-10-CM

## 2024-11-15 DIAGNOSIS — D72.829 ELEVATED WHITE BLOOD CELL COUNT, UNSPECIFIED: ICD-10-CM

## 2024-11-15 PROBLEM — I71.40 ABDOMINAL AORTIC ANEURYSM, WITHOUT RUPTURE, UNSPECIFIED: Chronic | Status: ACTIVE | Noted: 2024-11-11

## 2024-11-15 PROBLEM — F17.200 NICOTINE DEPENDENCE, UNSPECIFIED, UNCOMPLICATED: Chronic | Status: ACTIVE | Noted: 2024-11-11

## 2024-11-15 PROBLEM — K21.9 GASTRO-ESOPHAGEAL REFLUX DISEASE WITHOUT ESOPHAGITIS: Chronic | Status: ACTIVE | Noted: 2024-11-11

## 2024-11-15 LAB
ALBUMIN SERPL ELPH-MCNC: 3.9 G/DL
ALP BLD-CCNC: 92 U/L
ALT SERPL-CCNC: 8 U/L
ANION GAP SERPL CALC-SCNC: 17 MMOL/L
AST SERPL-CCNC: 13 U/L
BASOPHILS # BLD AUTO: 0.1 K/UL
BASOPHILS NFR BLD AUTO: 0.7 %
BILIRUB SERPL-MCNC: 0.2 MG/DL
BUN SERPL-MCNC: 20 MG/DL
CALCIUM SERPL-MCNC: 9.1 MG/DL
CHLORIDE SERPL-SCNC: 95 MMOL/L
CHOLEST SERPL-MCNC: 167 MG/DL
CK SERPL-CCNC: 48 U/L
CO2 SERPL-SCNC: 22 MMOL/L
CREAT SERPL-MCNC: 0.98 MG/DL
EGFR: 67 ML/MIN/1.73M2
EOSINOPHIL # BLD AUTO: 0.3 K/UL
EOSINOPHIL NFR BLD AUTO: 2 %
ESTIMATED AVERAGE GLUCOSE: 126 MG/DL
GLUCOSE SERPL-MCNC: 106 MG/DL
HBA1C MFR BLD HPLC: 6 %
HCT VFR BLD CALC: 48.5 %
HDLC SERPL-MCNC: 28 MG/DL
HGB BLD-MCNC: 15.9 G/DL
IMM GRANULOCYTES NFR BLD AUTO: 1.4 %
LDLC SERPL CALC-MCNC: 95 MG/DL
LDLC SERPL DIRECT ASSAY-MCNC: 91 MG/DL
LYMPHOCYTES # BLD AUTO: 3.47 K/UL
LYMPHOCYTES NFR BLD AUTO: 22.6 %
MAN DIFF?: NORMAL
MCHC RBC-ENTMCNC: 31.2 PG
MCHC RBC-ENTMCNC: 32.8 G/DL
MCV RBC AUTO: 95.3 FL
MONOCYTES # BLD AUTO: 0.95 K/UL
MONOCYTES NFR BLD AUTO: 6.2 %
NEUTROPHILS # BLD AUTO: 10.3 K/UL
NEUTROPHILS NFR BLD AUTO: 67.1 %
NONHDLC SERPL-MCNC: 140 MG/DL
PLATELET # BLD AUTO: 375 K/UL
POTASSIUM SERPL-SCNC: 4.2 MMOL/L
PROT SERPL-MCNC: 6.7 G/DL
RBC # BLD: 5.09 M/UL
RBC # FLD: 14.3 %
SODIUM SERPL-SCNC: 134 MMOL/L
T4 FREE SERPL-MCNC: 1.4 NG/DL
TRIGL SERPL-MCNC: 266 MG/DL
TSH SERPL-ACNC: 2.52 UIU/ML
WBC # FLD AUTO: 15.33 K/UL

## 2024-11-16 ENCOUNTER — APPOINTMENT (OUTPATIENT)
Dept: CARDIOLOGY | Facility: CLINIC | Age: 59
End: 2024-11-16
Payer: COMMERCIAL

## 2024-11-16 PROCEDURE — 93306 TTE W/DOPPLER COMPLETE: CPT

## 2024-11-16 PROCEDURE — 93880 EXTRACRANIAL BILAT STUDY: CPT

## 2024-11-20 ENCOUNTER — RESULT REVIEW (OUTPATIENT)
Age: 59
End: 2024-11-20

## 2024-11-20 LAB
ANION GAP SERPL CALC-SCNC: 14 MMOL/L
BASOPHILS # BLD AUTO: 0.1 K/UL
BASOPHILS NFR BLD AUTO: 0.8 %
BUN SERPL-MCNC: 20 MG/DL
CALCIUM SERPL-MCNC: 9.6 MG/DL
CHLORIDE SERPL-SCNC: 99 MMOL/L
CO2 SERPL-SCNC: 27 MMOL/L
CREAT SERPL-MCNC: 0.75 MG/DL
EGFR: 93 ML/MIN/1.73M2
EOSINOPHIL # BLD AUTO: 0.29 K/UL
EOSINOPHIL NFR BLD AUTO: 2.4 %
GLUCOSE SERPL-MCNC: 116 MG/DL
HCT VFR BLD CALC: 49.9 %
HGB BLD-MCNC: 16 G/DL
IMM GRANULOCYTES NFR BLD AUTO: 0.6 %
LYMPHOCYTES # BLD AUTO: 2.43 K/UL
LYMPHOCYTES NFR BLD AUTO: 20.1 %
MAN DIFF?: NORMAL
MCHC RBC-ENTMCNC: 31.4 PG
MCHC RBC-ENTMCNC: 32.1 G/DL
MCV RBC AUTO: 97.8 FL
MONOCYTES # BLD AUTO: 0.77 K/UL
MONOCYTES NFR BLD AUTO: 6.4 %
NEUTROPHILS # BLD AUTO: 8.42 K/UL
NEUTROPHILS NFR BLD AUTO: 69.7 %
PLATELET # BLD AUTO: 374 K/UL
POTASSIUM SERPL-SCNC: 4.2 MMOL/L
RBC # BLD: 5.1 M/UL
RBC # FLD: 13.5 %
SODIUM SERPL-SCNC: 139 MMOL/L
WBC # FLD AUTO: 12.08 K/UL

## 2024-11-25 DIAGNOSIS — R89.9 UNSPECIFIED ABNORMAL FINDING IN SPECIMENS FROM OTHER ORGANS, SYSTEMS AND TISSUES: ICD-10-CM

## 2024-11-26 ENCOUNTER — APPOINTMENT (OUTPATIENT)
Dept: CT IMAGING | Facility: CLINIC | Age: 59
End: 2024-11-26
Payer: COMMERCIAL

## 2024-11-26 ENCOUNTER — OUTPATIENT (OUTPATIENT)
Dept: OUTPATIENT SERVICES | Facility: HOSPITAL | Age: 59
LOS: 1 days | End: 2024-11-26
Payer: COMMERCIAL

## 2024-11-26 DIAGNOSIS — R06.02 SHORTNESS OF BREATH: ICD-10-CM

## 2024-11-26 DIAGNOSIS — Z00.8 ENCOUNTER FOR OTHER GENERAL EXAMINATION: ICD-10-CM

## 2024-11-26 PROCEDURE — 75574 CT ANGIO HRT W/3D IMAGE: CPT | Mod: 26

## 2024-11-26 PROCEDURE — 75574 CT ANGIO HRT W/3D IMAGE: CPT

## 2024-11-27 ENCOUNTER — NON-APPOINTMENT (OUTPATIENT)
Age: 59
End: 2024-11-27

## 2024-11-27 VITALS — WEIGHT: 187 LBS | BODY MASS INDEX: 30.05 KG/M2 | HEIGHT: 66 IN

## 2024-11-27 DIAGNOSIS — F17.200 NICOTINE DEPENDENCE, UNSPECIFIED, UNCOMPLICATED: ICD-10-CM

## 2024-11-27 DIAGNOSIS — Z80.1 FAMILY HISTORY OF MALIGNANT NEOPLASM OF TRACHEA, BRONCHUS AND LUNG: ICD-10-CM

## 2024-11-29 ENCOUNTER — OUTPATIENT (OUTPATIENT)
Dept: OUTPATIENT SERVICES | Facility: HOSPITAL | Age: 59
LOS: 1 days | End: 2024-11-29
Payer: COMMERCIAL

## 2024-11-29 ENCOUNTER — APPOINTMENT (OUTPATIENT)
Dept: CT IMAGING | Facility: HOSPITAL | Age: 59
End: 2024-11-29
Payer: COMMERCIAL

## 2024-11-29 ENCOUNTER — NON-APPOINTMENT (OUTPATIENT)
Age: 59
End: 2024-11-29

## 2024-11-29 DIAGNOSIS — F17.200 NICOTINE DEPENDENCE, UNSPECIFIED, UNCOMPLICATED: ICD-10-CM

## 2024-11-29 PROCEDURE — 71271 CT THORAX LUNG CANCER SCR C-: CPT | Mod: 26

## 2024-11-29 PROCEDURE — 71271 CT THORAX LUNG CANCER SCR C-: CPT

## 2024-12-02 ENCOUNTER — INPATIENT (INPATIENT)
Facility: HOSPITAL | Age: 59
LOS: 1 days | Discharge: HOME | DRG: 301 | End: 2024-12-04
Attending: SURGERY | Admitting: SURGERY
Payer: COMMERCIAL

## 2024-12-02 ENCOUNTER — APPOINTMENT (OUTPATIENT)
Dept: VASCULAR SURGERY | Facility: HOSPITAL | Age: 59
End: 2024-12-02

## 2024-12-02 VITALS
RESPIRATION RATE: 16 BRPM | HEART RATE: 79 BPM | OXYGEN SATURATION: 96 % | WEIGHT: 184.09 LBS | TEMPERATURE: 97 F | DIASTOLIC BLOOD PRESSURE: 82 MMHG | SYSTOLIC BLOOD PRESSURE: 123 MMHG | HEIGHT: 65.98 IN

## 2024-12-02 DIAGNOSIS — I71.40 ABDOMINAL AORTIC ANEURYSM, WITHOUT RUPTURE, UNSPECIFIED: ICD-10-CM

## 2024-12-02 LAB
GAS PNL BLDA: SIGNIFICANT CHANGE UP

## 2024-12-02 PROCEDURE — 34705 EVAC RPR A-BIILIAC NDGFT: CPT

## 2024-12-02 PROCEDURE — 34713 PERQ ACCESS & CLSR FEM ART: CPT | Mod: 50

## 2024-12-02 DEVICE — CATH ANGIO GLIDECATH ANGLE 5FR X 100CM: Type: IMPLANTABLE DEVICE | Status: FUNCTIONAL

## 2024-12-02 DEVICE — SHEATH 16 FR 45CM: Type: IMPLANTABLE DEVICE | Status: FUNCTIONAL

## 2024-12-02 DEVICE — INTRODUCER MICROPUNCTURE STIFF 5FR X 10CM: Type: IMPLANTABLE DEVICE | Status: FUNCTIONAL

## 2024-12-02 DEVICE — CATH PERFORMA PIGTAIL 5FX100CM: Type: IMPLANTABLE DEVICE | Status: FUNCTIONAL

## 2024-12-02 DEVICE — SHEATH INTRODUCER TERUMO PINNACLE PERIPHERAL 6FR X 10CM: Type: IMPLANTABLE DEVICE | Status: FUNCTIONAL

## 2024-12-02 DEVICE — IMPLANTABLE DEVICE: Type: IMPLANTABLE DEVICE | Status: FUNCTIONAL

## 2024-12-02 DEVICE — VISTASEAL FIBRIN HUMAN 4ML: Type: IMPLANTABLE DEVICE | Status: FUNCTIONAL

## 2024-12-02 DEVICE — SHEATH INTRODUCER TERUMO PINNACLE PERIPHERAL 11FR X 10CM: Type: IMPLANTABLE DEVICE | Status: FUNCTIONAL

## 2024-12-02 DEVICE — CLIP APPLIER COVIDIEN SURGICLIP III 9" SM: Type: IMPLANTABLE DEVICE | Status: FUNCTIONAL

## 2024-12-02 DEVICE — CATH BALLOON CODA  0.035" X 12FR X 30CM: Type: IMPLANTABLE DEVICE | Status: FUNCTIONAL

## 2024-12-02 DEVICE — GUIDEWIRE AMPLATZ SUPER-STIFF STRAIGHT .035" X 180CM: Type: IMPLANTABLE DEVICE | Status: FUNCTIONAL

## 2024-12-02 DEVICE — GWIRE BENT 0.035INX180CM TFE: Type: IMPLANTABLE DEVICE | Status: FUNCTIONAL

## 2024-12-02 DEVICE — SUT PERCLOSE PROGLIDE 6FR: Type: IMPLANTABLE DEVICE | Status: FUNCTIONAL

## 2024-12-02 DEVICE — GUIDEWIRE RADIFOCUS GLIDEWIRE STANDARD ANGLED TIP 0.035" X 260CM: Type: IMPLANTABLE DEVICE | Status: FUNCTIONAL

## 2024-12-02 DEVICE — CLIP APPLIER COVIDIEN SURGICLIP II 9.75" MEDIUM: Type: IMPLANTABLE DEVICE | Status: FUNCTIONAL

## 2024-12-02 DEVICE — GUIDEWIRE LUNDERQUIST EXTRA-STIFF DOUBLE CURVED .035" X 260CM: Type: IMPLANTABLE DEVICE | Status: FUNCTIONAL

## 2024-12-02 RX ORDER — CHLORHEXIDINE GLUCONATE 1.2 MG/ML
1 RINSE ORAL ONCE
Refills: 0 | Status: DISCONTINUED | OUTPATIENT
Start: 2024-12-02 | End: 2024-12-02

## 2024-12-02 RX ORDER — OXYCODONE HYDROCHLORIDE 30 MG/1
5 TABLET ORAL EVERY 4 HOURS
Refills: 0 | Status: DISCONTINUED | OUTPATIENT
Start: 2024-12-02 | End: 2024-12-04

## 2024-12-02 RX ORDER — OXYCODONE HYDROCHLORIDE 30 MG/1
10 TABLET ORAL EVERY 4 HOURS
Refills: 0 | Status: DISCONTINUED | OUTPATIENT
Start: 2024-12-02 | End: 2024-12-02

## 2024-12-02 RX ORDER — 0.9 % SODIUM CHLORIDE 0.9 %
1000 INTRAVENOUS SOLUTION INTRAVENOUS
Refills: 0 | Status: DISCONTINUED | OUTPATIENT
Start: 2024-12-02 | End: 2024-12-02

## 2024-12-02 RX ORDER — NALOXEGOL OXALATE 12.5 MG/1
1 TABLET, FILM COATED ORAL
Refills: 0 | DISCHARGE

## 2024-12-02 RX ORDER — TRIAMTERENE AND HYDROCHLOROTHIAZIDE 25; 37.5 MG/1; MG/1
1 CAPSULE ORAL DAILY
Refills: 0 | Status: DISCONTINUED | OUTPATIENT
Start: 2024-12-02 | End: 2024-12-04

## 2024-12-02 RX ORDER — ACETAMINOPHEN 500MG 500 MG/1
1000 TABLET, COATED ORAL EVERY 6 HOURS
Refills: 0 | Status: COMPLETED | OUTPATIENT
Start: 2024-12-02 | End: 2024-12-03

## 2024-12-02 RX ORDER — SODIUM CHLORIDE 9 MG/ML
3 INJECTION, SOLUTION INTRAMUSCULAR; INTRAVENOUS; SUBCUTANEOUS EVERY 8 HOURS
Refills: 0 | Status: DISCONTINUED | OUTPATIENT
Start: 2024-12-02 | End: 2024-12-02

## 2024-12-02 RX ORDER — PANTOPRAZOLE SODIUM 40 MG/1
40 TABLET, DELAYED RELEASE ORAL
Refills: 0 | Status: DISCONTINUED | OUTPATIENT
Start: 2024-12-02 | End: 2024-12-04

## 2024-12-02 RX ORDER — HYDROMORPHONE HYDROCHLORIDE 2 MG/1
1 TABLET ORAL
Refills: 0 | Status: DISCONTINUED | OUTPATIENT
Start: 2024-12-02 | End: 2024-12-02

## 2024-12-02 RX ORDER — OXYCODONE HYDROCHLORIDE 30 MG/1
5 TABLET ORAL ONCE
Refills: 0 | Status: DISCONTINUED | OUTPATIENT
Start: 2024-12-02 | End: 2024-12-02

## 2024-12-02 RX ORDER — NALOXEGOL OXALATE 12.5 MG/1
25 TABLET, FILM COATED ORAL DAILY
Refills: 0 | Status: DISCONTINUED | OUTPATIENT
Start: 2024-12-03 | End: 2024-12-04

## 2024-12-02 RX ORDER — OXYCODONE HYDROCHLORIDE 30 MG/1
5 TABLET ORAL EVERY 4 HOURS
Refills: 0 | Status: DISCONTINUED | OUTPATIENT
Start: 2024-12-02 | End: 2024-12-02

## 2024-12-02 RX ORDER — OXYCODONE HYDROCHLORIDE 30 MG/1
10 TABLET ORAL EVERY 4 HOURS
Refills: 0 | Status: DISCONTINUED | OUTPATIENT
Start: 2024-12-02 | End: 2024-12-04

## 2024-12-02 RX ORDER — 0.9 % SODIUM CHLORIDE 0.9 %
1000 INTRAVENOUS SOLUTION INTRAVENOUS
Refills: 0 | Status: DISCONTINUED | OUTPATIENT
Start: 2024-12-02 | End: 2024-12-03

## 2024-12-02 RX ORDER — HYDROMORPHONE HYDROCHLORIDE 2 MG/1
0.5 TABLET ORAL
Refills: 0 | Status: DISCONTINUED | OUTPATIENT
Start: 2024-12-02 | End: 2024-12-02

## 2024-12-02 RX ORDER — LIDOCAINE HCL 20 MG/ML
0.2 VIAL (ML) INJECTION ONCE
Refills: 0 | Status: DISCONTINUED | OUTPATIENT
Start: 2024-12-02 | End: 2024-12-02

## 2024-12-02 RX ORDER — ONDANSETRON HYDROCHLORIDE 4 MG/1
4 TABLET, FILM COATED ORAL ONCE
Refills: 0 | Status: DISCONTINUED | OUTPATIENT
Start: 2024-12-02 | End: 2024-12-02

## 2024-12-02 RX ORDER — TRIAMTERENE/HYDROCHLOROTHIAZID 37.5-25 MG
1 CAPSULE ORAL
Refills: 0 | DISCHARGE

## 2024-12-02 RX ORDER — HEPARIN SODIUM,PORCINE 1000/ML
5000 VIAL (ML) INJECTION EVERY 8 HOURS
Refills: 0 | Status: DISCONTINUED | OUTPATIENT
Start: 2024-12-02 | End: 2024-12-04

## 2024-12-02 RX ORDER — FENTANYL 12 UG/H
50 PATCH, EXTENDED RELEASE TRANSDERMAL
Refills: 0 | Status: DISCONTINUED | OUTPATIENT
Start: 2024-12-02 | End: 2024-12-02

## 2024-12-02 RX ADMIN — FENTANYL 50 MICROGRAM(S): 12 PATCH, EXTENDED RELEASE TRANSDERMAL at 15:20

## 2024-12-02 RX ADMIN — Medication 5000 UNIT(S): at 22:20

## 2024-12-02 RX ADMIN — OXYCODONE HYDROCHLORIDE 10 MILLIGRAM(S): 30 TABLET ORAL at 22:20

## 2024-12-02 RX ADMIN — HYDROMORPHONE HYDROCHLORIDE 1 MILLIGRAM(S): 2 TABLET ORAL at 15:35

## 2024-12-02 RX ADMIN — HYDROMORPHONE HYDROCHLORIDE 1 MILLIGRAM(S): 2 TABLET ORAL at 17:30

## 2024-12-02 RX ADMIN — FENTANYL 50 MICROGRAM(S): 12 PATCH, EXTENDED RELEASE TRANSDERMAL at 14:35

## 2024-12-02 RX ADMIN — FENTANYL 50 MICROGRAM(S): 12 PATCH, EXTENDED RELEASE TRANSDERMAL at 15:10

## 2024-12-02 RX ADMIN — FENTANYL 50 MICROGRAM(S): 12 PATCH, EXTENDED RELEASE TRANSDERMAL at 15:00

## 2024-12-02 RX ADMIN — OXYCODONE HYDROCHLORIDE 10 MILLIGRAM(S): 30 TABLET ORAL at 23:20

## 2024-12-02 RX ADMIN — HYDROMORPHONE HYDROCHLORIDE 1 MILLIGRAM(S): 2 TABLET ORAL at 16:30

## 2024-12-02 RX ADMIN — FENTANYL 50 MICROGRAM(S): 12 PATCH, EXTENDED RELEASE TRANSDERMAL at 14:50

## 2024-12-02 RX ADMIN — HYDROMORPHONE HYDROCHLORIDE 1 MILLIGRAM(S): 2 TABLET ORAL at 15:45

## 2024-12-02 RX ADMIN — FENTANYL 50 MICROGRAM(S): 12 PATCH, EXTENDED RELEASE TRANSDERMAL at 14:55

## 2024-12-02 RX ADMIN — HYDROMORPHONE HYDROCHLORIDE 1 MILLIGRAM(S): 2 TABLET ORAL at 16:45

## 2024-12-02 RX ADMIN — HYDROMORPHONE HYDROCHLORIDE 1 MILLIGRAM(S): 2 TABLET ORAL at 17:45

## 2024-12-02 RX ADMIN — Medication 75 MILLILITER(S): at 15:00

## 2024-12-02 RX ADMIN — FENTANYL 50 MICROGRAM(S): 12 PATCH, EXTENDED RELEASE TRANSDERMAL at 14:40

## 2024-12-02 NOTE — PACU DISCHARGE NOTE - AIRWAY PATENCY:
Please call pt, his iron defic anemia persists despite iron supplement.  I'll forward copy to GI Dr Garcia- since he may need endoscopic workup.  If ok w Ramy, I do rec we proceed w an iron infusion at [facility], dx iron defic anemia- for venofer.  
Satisfactory

## 2024-12-02 NOTE — PRE-ANESTHESIA EVALUATION ADULT - NSANTHPMHFT_GEN_ALL_CORE
60 y/o female with past medical hx of Spinal Stenosis, Obesity, GERD and  Abdominal Aortic Aneurism. Recent ER visit at [facility] for abdominal  pain associated with  nausea, vomiting and diarrhea, treated for colitis (discharged on Flagyl po last dose on 11/16/24).   Presenting to PST prior to scheduled Endovascular abdominal aneurysm repair

## 2024-12-03 ENCOUNTER — TRANSCRIPTION ENCOUNTER (OUTPATIENT)
Age: 59
End: 2024-12-03

## 2024-12-03 LAB
ANION GAP SERPL CALC-SCNC: 14 MMOL/L — SIGNIFICANT CHANGE UP (ref 5–17)
BASOPHILS # BLD AUTO: 0.04 K/UL — SIGNIFICANT CHANGE UP (ref 0–0.2)
BASOPHILS NFR BLD AUTO: 0.3 % — SIGNIFICANT CHANGE UP (ref 0–2)
BUN SERPL-MCNC: 13 MG/DL — SIGNIFICANT CHANGE UP (ref 7–23)
CALCIUM SERPL-MCNC: 8.4 MG/DL — SIGNIFICANT CHANGE UP (ref 8.4–10.5)
CHLORIDE SERPL-SCNC: 98 MMOL/L — SIGNIFICANT CHANGE UP (ref 96–108)
CO2 SERPL-SCNC: 24 MMOL/L — SIGNIFICANT CHANGE UP (ref 22–31)
CREAT SERPL-MCNC: 0.67 MG/DL — SIGNIFICANT CHANGE UP (ref 0.5–1.3)
EGFR: 102 ML/MIN/1.73M2 — SIGNIFICANT CHANGE UP
EOSINOPHIL # BLD AUTO: 0.11 K/UL — SIGNIFICANT CHANGE UP (ref 0–0.5)
EOSINOPHIL NFR BLD AUTO: 0.7 % — SIGNIFICANT CHANGE UP (ref 0–6)
GLUCOSE SERPL-MCNC: 154 MG/DL — HIGH (ref 70–99)
HCT VFR BLD CALC: 32.2 % — LOW (ref 34.5–45)
HGB BLD-MCNC: 10.3 G/DL — LOW (ref 11.5–15.5)
IMM GRANULOCYTES NFR BLD AUTO: 0.8 % — SIGNIFICANT CHANGE UP (ref 0–0.9)
LYMPHOCYTES # BLD AUTO: 17.2 % — SIGNIFICANT CHANGE UP (ref 13–44)
LYMPHOCYTES # BLD AUTO: 2.73 K/UL — SIGNIFICANT CHANGE UP (ref 1–3.3)
MCHC RBC-ENTMCNC: 31 PG — SIGNIFICANT CHANGE UP (ref 27–34)
MCHC RBC-ENTMCNC: 32 G/DL — SIGNIFICANT CHANGE UP (ref 32–36)
MCV RBC AUTO: 97 FL — SIGNIFICANT CHANGE UP (ref 80–100)
MONOCYTES # BLD AUTO: 0.87 K/UL — SIGNIFICANT CHANGE UP (ref 0–0.9)
MONOCYTES NFR BLD AUTO: 5.5 % — SIGNIFICANT CHANGE UP (ref 2–14)
NEUTROPHILS # BLD AUTO: 12.04 K/UL — HIGH (ref 1.8–7.4)
NEUTROPHILS NFR BLD AUTO: 75.5 % — SIGNIFICANT CHANGE UP (ref 43–77)
NRBC # BLD: 0 /100 WBCS — SIGNIFICANT CHANGE UP (ref 0–0)
PLATELET # BLD AUTO: 133 K/UL — LOW (ref 150–400)
POTASSIUM SERPL-MCNC: 3.7 MMOL/L — SIGNIFICANT CHANGE UP (ref 3.5–5.3)
POTASSIUM SERPL-SCNC: 3.7 MMOL/L — SIGNIFICANT CHANGE UP (ref 3.5–5.3)
RBC # BLD: 3.32 M/UL — LOW (ref 3.8–5.2)
RBC # FLD: 13.5 % — SIGNIFICANT CHANGE UP (ref 10.3–14.5)
SODIUM SERPL-SCNC: 136 MMOL/L — SIGNIFICANT CHANGE UP (ref 135–145)
WBC # BLD: 15.91 K/UL — HIGH (ref 3.8–10.5)
WBC # FLD AUTO: 15.91 K/UL — HIGH (ref 3.8–10.5)

## 2024-12-03 RX ORDER — POTASSIUM CHLORIDE 600 MG/1
10 TABLET, EXTENDED RELEASE ORAL ONCE
Refills: 0 | Status: COMPLETED | OUTPATIENT
Start: 2024-12-03 | End: 2024-12-03

## 2024-12-03 RX ORDER — DICLOFENAC SODIUM 20 MG/G
2 SOLUTION TOPICAL
Refills: 0 | Status: DISCONTINUED | OUTPATIENT
Start: 2024-12-03 | End: 2024-12-04

## 2024-12-03 RX ADMIN — DICLOFENAC SODIUM 2 GRAM(S): 20 SOLUTION TOPICAL at 18:26

## 2024-12-03 RX ADMIN — OXYCODONE HYDROCHLORIDE 10 MILLIGRAM(S): 30 TABLET ORAL at 06:16

## 2024-12-03 RX ADMIN — OXYCODONE HYDROCHLORIDE 10 MILLIGRAM(S): 30 TABLET ORAL at 20:21

## 2024-12-03 RX ADMIN — ACETAMINOPHEN 500MG 1000 MILLIGRAM(S): 500 TABLET, COATED ORAL at 13:10

## 2024-12-03 RX ADMIN — PANTOPRAZOLE SODIUM 40 MILLIGRAM(S): 40 TABLET, DELAYED RELEASE ORAL at 06:34

## 2024-12-03 RX ADMIN — TRIAMTERENE AND HYDROCHLOROTHIAZIDE 1 TABLET(S): 25; 37.5 CAPSULE ORAL at 06:34

## 2024-12-03 RX ADMIN — POTASSIUM CHLORIDE 10 MILLIEQUIVALENT(S): 600 TABLET, EXTENDED RELEASE ORAL at 14:19

## 2024-12-03 RX ADMIN — OXYCODONE HYDROCHLORIDE 10 MILLIGRAM(S): 30 TABLET ORAL at 07:16

## 2024-12-03 RX ADMIN — NALOXEGOL OXALATE 25 MILLIGRAM(S): 12.5 TABLET, FILM COATED ORAL at 12:40

## 2024-12-03 RX ADMIN — ACETAMINOPHEN 500MG 1000 MILLIGRAM(S): 500 TABLET, COATED ORAL at 07:05

## 2024-12-03 RX ADMIN — OXYCODONE HYDROCHLORIDE 10 MILLIGRAM(S): 30 TABLET ORAL at 15:53

## 2024-12-03 RX ADMIN — ACETAMINOPHEN 500MG 400 MILLIGRAM(S): 500 TABLET, COATED ORAL at 12:40

## 2024-12-03 RX ADMIN — Medication 5000 UNIT(S): at 06:34

## 2024-12-03 RX ADMIN — ACETAMINOPHEN 500MG 1000 MILLIGRAM(S): 500 TABLET, COATED ORAL at 00:44

## 2024-12-03 RX ADMIN — Medication 5000 UNIT(S): at 21:34

## 2024-12-03 RX ADMIN — ACETAMINOPHEN 500MG 400 MILLIGRAM(S): 500 TABLET, COATED ORAL at 17:21

## 2024-12-03 RX ADMIN — ACETAMINOPHEN 500MG 1000 MILLIGRAM(S): 500 TABLET, COATED ORAL at 17:51

## 2024-12-03 RX ADMIN — OXYCODONE HYDROCHLORIDE 10 MILLIGRAM(S): 30 TABLET ORAL at 21:21

## 2024-12-03 RX ADMIN — Medication 5000 UNIT(S): at 14:19

## 2024-12-03 RX ADMIN — ACETAMINOPHEN 500MG 400 MILLIGRAM(S): 500 TABLET, COATED ORAL at 00:14

## 2024-12-03 RX ADMIN — OXYCODONE HYDROCHLORIDE 10 MILLIGRAM(S): 30 TABLET ORAL at 16:53

## 2024-12-03 RX ADMIN — ACETAMINOPHEN 500MG 400 MILLIGRAM(S): 500 TABLET, COATED ORAL at 06:35

## 2024-12-03 NOTE — DISCHARGE NOTE PROVIDER - NSDCDCMDCOMP_GEN_ALL_CORE
[facility]: [facility]  [address]    Psychiatric Progress Note  MRN#: [id number]  Ro Miller 27 y o  female      Verification of patient location:  Patient is located in the following state in which I hold an active license [place]    After connecting through televideo, the patient was identified by name and date of birth  Ro Miller was informed that this is a telemedicine visit and that the visit is being conducted through the [address] Now platform  She agrees to proceed  My office door was closed  No one else was in the room  She and guardian acknowledged consent and understanding of privacy and security of the video platform  The patient has agreed to participate and understands they can discontinue the visit at any time  Patient is aware this is a billable service  Reason for Visit:   Chief Complaint   Patient presents with   • Virtual Regular Visit       Information provided by patient, and review of chart     Subjective:    Medication compliance: Yes  Medication side effects:none     Limited activities outside of the home   Working 18-20 hrs per week   Taking 2 courses - in person; semester ending [address]  Variable grades; unable to improve grade in one class   Denies significant anxiety/worries, but relays incidents of sibling escalating to throwing items and threatening people --- patient isolating to room when home; avoiding conflict and interactions  Additional conflicts with bio mom  Some instances of tearfulness/ low mood     Looking forward to summer concert   +drivers permit and practicing driving   Overall improvement in ability to focus and complete work when on campus; difficulties at home   Continued variable PO intake -- some days with increased quantity of intake vs other days very limited  Often eating more at night vs during the day     Body image and weight negatively impacting mood  Continued services through [address] Case Management  - Psychotherapy through     Review Of Systems: no complaints     Past Medical History:   Patient Active Problem List   Diagnosis   • Generalized anxiety disorder   • Attention deficit disorder predominant inattentive type   • Thoughts of self harm   • Obesity, Class III, BMI 40-49 9 (morbid obesity) ([address] )     Back issues with possible surgery - benefit from lidocaine patches  Bariatric referral (pending); possible weight loss surgery prior to back surgery  Allergies: Allergies   Allergen Reactions   • Benadryl [Diphenhydramine] Hyperactivity   • Mangifera Indica Itching   • Pineapple - Food Allergy Itching       Past Surgical History:   Past Surgical History:   Procedure Laterality Date   • WISDOM TOOTH EXTRACTION       Psychiatric History:   Multiple past medication trials including stimulants and SSRIs   [facility] inpatient admission 03/04/2023 - 03/09/2023 due to worsening depression and SI w/ plan -- trigger of stress w/ school and work    Social History:   Housing: lives with grandma and younger brother x years  (GM w/ formal custody)     Long history of bio mom intermittently living in home; recently bio mom and (half brother) in home ~ 5years; moved out Fall 2022 and returned to the home end of Dec 2022    Household dynamics shift during periods bio mom living in home  Education:  Graduated from [employer] Spring 2022   Enrolled in [employer] program) -- Fall semester  Bio +Lab, psychology, math, and college success (passed all classes  More difficulties during Spring Semester 2023     Employed at [employer] -- enjoys; hours vary; additional social opportunities      Substance Abuse History:   Marijuana delta-8 at bedtime - restarted bedtime use once home  Will sleep through the the night     vaping nicotine - variable      Traumatic History: denies     The following portions of the patient's history were reviewed and updated as appropriate: allergies, current medications, past family history, past medical history, past social history, past surgical history and problem list     Objective: There were no vitals filed for this visit  Weight (last 2 days)     None          Medications:   Current Outpatient Medications on File Prior to Visit   Medication Sig Dispense Refill   • [START ON 6/7/2023] cholecalciferol (VITAMIN D3) 1,000 units tablet Take 1 tablet (1,000 Units total) by mouth daily Do not start before June 7, 2023  30 tablet 0   • ergocalciferol (VITAMIN D2) 50,000 units Take 1 capsule (50,000 Units total) by mouth once a week for 13 doses Do not start before March 14, 2023  13 capsule 0   • FLUoxetine (PROzac) 20 mg capsule Take 1 capsule (20 mg total) by mouth daily 30 capsule 1   • FLUoxetine (PROzac) 40 MG capsule Take 1 capsule (40 mg total) by mouth daily With one 20mg prozac capsule (total daily dose = 60mg) 30 capsule 1   • guanFACINE (TENEX) 2 MG tablet Take 1 tablet (2 mg total) by mouth daily at bedtime 30 tablet 1   • levonorgestrel-ethinyl estradiol (Chapis) 90-20 MCG per tablet Take 1 tablet by mouth daily 90 tablet 0   • lidocaine (LIDODERM) 5 % Apply 1 patch topically over 12 hours daily Remove & Discard patch within 12 hours or as directed by MD Do not start before March 9, 2023  30 patch 0   • naltrexone (REVIA) 50 mg tablet Take 1 tablet (50 mg total) by mouth daily 30 tablet 1   • traZODone (DESYREL) 50 mg tablet Take 1 tablet (50 mg total) by mouth daily at bedtime (Patient not taking: Reported on 3/22/2023) 30 tablet 0     No current facility-administered medications on file prior to visit        Current Outpatient Medications   Medication Sig Dispense Refill   • [START ON 6/7/2023] cholecalciferol (VITAMIN D3) 1,000 units tablet Take 1 tablet (1,000 Units total) by mouth daily Do not start before June 7, 2023  30 "tablet 0   • ergocalciferol (VITAMIN D2) 50,000 units Take 1 capsule (50,000 Units total) by mouth once a week for 13 doses Do not start before March 14, 2023  13 capsule 0   • FLUoxetine (PROzac) 20 mg capsule Take 1 capsule (20 mg total) by mouth daily 30 capsule 1   • FLUoxetine (PROzac) 40 MG capsule Take 1 capsule (40 mg total) by mouth daily With one 20mg prozac capsule (total daily dose = 60mg) 30 capsule 1   • guanFACINE (TENEX) 2 MG tablet Take 1 tablet (2 mg total) by mouth daily at bedtime 30 tablet 1   • levonorgestrel-ethinyl estradiol (Chapis) 90-20 MCG per tablet Take 1 tablet by mouth daily 90 tablet 0   • lidocaine (LIDODERM) 5 % Apply 1 patch topically over 12 hours daily Remove & Discard patch within 12 hours or as directed by MD Do not start before March 9, 2023  30 patch 0   • naltrexone (REVIA) 50 mg tablet Take 1 tablet (50 mg total) by mouth daily 30 tablet 1   • traZODone (DESYREL) 50 mg tablet Take 1 tablet (50 mg total) by mouth daily at bedtime (Patient not taking: Reported on 3/22/2023) 30 tablet 0     No current facility-administered medications for this visit         Mental status:  Appearance sitting comfortably in chair, dressed in casual clothing, adequate hygiene and grooming, cooperative with interview, fairly well related, good eye contact   Mood \"ok\"   Affect Appears mildly constricted; stable, mood-congruent   Speech Normal rate, rhythm, and volume   Thought Processes Linear and goal directed   Associations intact associations   Hallucinations Denies any auditory or visual hallucinations   Thought Content No active suicidal ideation, homicidal ideation,  intent, or plan  Denies passive suicidal ideations    Orientation Oriented to person, place, time, and situation   Recent and Remote Memory Grossly intact   Attention Span and Concentration Concentration intact   Intellect Appears to be of Average Intelligence   Insight Insight intact   Judgement judgment was intact   Muscle " Strength Muscle strength and tone were normal   Language Within normal limits   Fund of Knowledge Age appropriate       Assessment/Plan:     Impression:  Generalized Anxiety disorder - improving    Major depressive Disorder, moderate, recurrent - improving   Attention deficit hyperactivity disorder, combined presentation - stable with medication in place     1  Counseled patient to continue prozac 60mg daily due to improvement in mood     2  Counseled patient to continue guanfacine IR 2mg at bedtime (transitioned from guanfacine ER 4mg prior to admission) due to overall stability of adhd symptoms     3  Counseled patient to continue naltrexone 50mg daily due to overall decrease with self injurious behavior and urges since initiation of medication      4  In discussion with patient; counseled to start vyvanse 30mg in the morning in conjunction with weight management doctor  Monitor for further titration     5  Recommendation continuing outpatient psychotherapy and blended case management     6  Reassurance and supportive psychotherapy provided     Follow-up: 4 weeks due to additional of vyvanse     The clinical diagnosis, course and prognosis were explained to the patient  Discussed with patient the clinical indications, interactions, benefits, the most common and serious side effects of all current medications  The alternative treatment options were discussed  The importance of continuing psychotherapy was discussed  The patient were receptive and appeared to understand the information provided  Patient concerns and questions were addressed to their satisfaction during the appointment  Controlled Medication Discussion: consistent fills through [address]    No redflags noted       Treatment Plan:    Completed and signed during the session: Not applicable - Treatment Plan to be completed by [facility] [id number] [address] therapist        Visit Time    Visit Start Time: 186  Visit Stop Time: 6512  Total Visit Duration: 28 minutes This document is complete and the patient is ready for discharge.

## 2024-12-03 NOTE — DISCHARGE NOTE PROVIDER - NSDCFUSCHEDAPPT_GEN_ALL_CORE_FT
Reyes, John A  [facility] Physician [facility]  INTMED [address] [facility]  Scheduled Appointment: 12/13/2024    Josse Lechuga  [facility]  CARDIOLOGY [address]   Scheduled Appointment: 01/10/2025

## 2024-12-03 NOTE — DISCHARGE NOTE PROVIDER - PROVIDER TOKENS
PROVIDER:[TOKEN:[[id number]],FOLLOWUP:[2 weeks]] PROVIDER:[TOKEN:[[id number]],FOLLOWUP:[2 weeks]],PROVIDER:[TOKEN:[[id number]],FOLLOWUP:[Routine]],PROVIDER:[TOKEN:[[id number]],FOLLOWUP:[2 weeks]]

## 2024-12-03 NOTE — PROVIDER CONTACT NOTE (OTHER) - ASSESSMENT
Pt A&Ox4, upon assessment of surgical incision during the day, L-groin and lower abd is increasing in ecchymosis. Pt complains on increased pain in lower abd. Pt's swelling has increased to L-labia. Pt's area is soft and warm. Pulses (DP AND PT) WDL, pt denies numbness or tingling in b/l LE

## 2024-12-03 NOTE — DISCHARGE NOTE PROVIDER - NSDCCPCAREPLAN_GEN_ALL_CORE_FT
PRINCIPAL DISCHARGE DIAGNOSIS  Diagnosis: Abdominal aortic aneurysm without rupture  Assessment and Plan of Treatment: BATHING: You may shower and/or sponge bathe 24 hours after surgery. Let soap and water run over incision; do NOT scrub incision. Pat dry after. Do no submerge the incision underwater for the next 2 weeks.   ACTIVITY: No heavy lifting anything more than 10-15lbs or straining. Otherwise, you may return to your usual level of physical activity. If you are taking narcotic pain medication, do NOT drive a car, operate machinery or make important decisions.  PAIN: A prescription for oxycodone has been sent to the pharmacy. You should only take these for severe pain. For mild or moderate pain, you may take 975mg of tylenol every 6 hours. Please take tylenol every 6 hours, and stagger with ibuprofen every 6 hours. This will allow you to alternate medications for more consistent pain control. For example: take a dose of tylenol at 8 am, then take a dose of ibuprofen at 11 am, then take a dose of tylenol at 2pm, and continue as needed. Do not exceed more than 4G per day.   NOTIFY YOUR SURGEON IF: You have any bleeding that does not stop, any pus draining from your wound, any fever (over 100.4 F) or chills, persistent nausea/vomiting with inability to tolerate food or liquids, persistent diarrhea, or if severe abdominal pain is not controlled on your discharge pain medications.  FOLLOW-UP:  1. Please call to make a follow-up appointment within one to two weeks of discharge with Dr. Enriquez  2. Please follow up with your primary care physician in one week regarding your hospitalization.

## 2024-12-03 NOTE — PROVIDER CONTACT NOTE (OTHER) - REASON
"Last Written Prescription Date:  6/27/22  Last Fill Quantity: 360,  # refills: 0   Last office visit provider:  10/25/22     Requested Prescriptions   Pending Prescriptions Disp Refills     metFORMIN (GLUCOPHAGE) 500 MG tablet 360 tablet 0     Sig: Take 2 tablets (1,000 mg) by mouth 2 times daily (with meals)       Biguanide Agents Passed - 12/21/2022 10:20 AM        Passed - Patient is age 10 or older        Passed - Patient has documented A1c within the specified period of time.     If HgbA1C is 8 or greater, it needs to be on file within the past 3 months.  If less than 8, must be on file within the past 6 months.     Recent Labs   Lab Test 10/25/22  0747   A1C 7.0*             Passed - Patient's CR is NOT>1.4 OR Patient's EGFR is NOT<45 within past 12 mos.     Recent Labs   Lab Test 06/29/22  1419   GFRESTIMATED >90       Recent Labs   Lab Test 06/29/22  1419   CR 0.72             Passed - Patient does NOT have a diagnosis of CHF.        Passed - Medication is active on med list        Passed - Recent (6 mo) or future (30 days) visit within the authorizing provider's specialty     Patient had office visit in the last 6 months or has a visit in the next 30 days with authorizing provider or within the authorizing provider's specialty.  See \"Patient Info\" tab in inbasket, or \"Choose Columns\" in Meds & Orders section of the refill encounter.                 Rolando Harris RN 12/21/22 10:20 AM  " Pt's L-groin increasing in ecchymosis

## 2024-12-03 NOTE — DISCHARGE NOTE PROVIDER - CARE PROVIDER_API CALL
Rob Enriquez)  Vascular Surgery  [address] [facility], [address]  [place] [address]  Phone: [phone]  Fax: [phone]  Follow Up Time: 2 weeks   Rob Enriquez)  Vascular Surgery  [address] [facility], [address]  [place] [address]  Phone: [phone]  Fax: [phone]  Follow Up Time: 2 weeks    Josse Lechuga  Cardiology  [address] [facility], [address]  [place] [address]  Phone: [phone]  Fax: [phone]  Follow Up Time: Routine    Reyes, John Anthony  Internal Medicine  [address] [place] [address]  Phone: [phone]  Fax: [phone]  Follow Up Time: 2 weeks

## 2024-12-03 NOTE — DISCHARGE NOTE PROVIDER - CARE PROVIDERS DIRECT ADDRESSES
[email or web address]

## 2024-12-03 NOTE — PROGRESS NOTE ADULT - SUBJECTIVE AND OBJECTIVE BOX
SURGERY DAILY PROGRESS NOTE:     SUBJECTIVE/ROS: Patient seen and examined. Patient feels well, c/o discomfort from castaneda and pain around groin access sites  Denies nausea, vomiting, chest pain, shortness of breath. tolerating diet  no flatus, no BM     MEDICATIONS  (STANDING):  acetaminophen   IVPB .. 1000 milliGRAM(s) IV Intermittent every 6 hours  heparin   Injectable 5000 Unit(s) SubCutaneous every 8 hours  naloxegol 25 milliGRAM(s) Oral daily  pantoprazole    Tablet 40 milliGRAM(s) Oral before breakfast  triamterene 37.5 mG/hydrochlorothiazide 25 mG Tablet 1 Tablet(s) Oral daily    MEDICATIONS  (PRN):  oxyCODONE    IR 5 milliGRAM(s) Oral every 4 hours PRN Moderate Pain (4 - 6)  oxyCODONE    IR 10 milliGRAM(s) Oral every 4 hours PRN Severe Pain (7 - 10)      OBJECTIVE:  Vital Signs Last 24 Hrs  T(C): 36.8 (03 Dec 2024 05:31), Max: 36.9 (02 Dec 2024 21:15)  T(F): 98.2 (03 Dec 2024 05:31), Max: 98.4 (02 Dec 2024 21:15)  HR: 86 (03 Dec 2024 06:33) (73 - 100)  BP: 111/71 (03 Dec 2024 06:33) (110/68 - 138/83)  BP(mean): 86 (02 Dec 2024 16:00) (84 - 90)  RR: 18 (03 Dec 2024 06:33) (16 - 18)  SpO2: 95% (03 Dec 2024 06:33) (92% - 100%)    Parameters below as of 03 Dec 2024 06:33  Patient On (Oxygen Delivery Method): room air      I&O's Detail    02 Dec 2024 07:01  -  03 Dec 2024 07:00  --------------------------------------------------------  IN:    Lactated Ringers: 600 mL    Oral Fluid: 440 mL  Total IN: 1040 mL    OUT:    Indwelling Catheter - Urethral (mL): 3060 mL  Total OUT: 3060 mL    Total NET: -2020 mL        Daily     Daily     LABS:        PHYSICAL EXAM:  Constitutional: well developed, well nourished, NAD  Respiratory: non labored breathing noted  Gastrointestinal: abdomen soft, nontender, nondistended. No obvious masses. No peritonitis  L Groin site w/ ecchymosis, soft, no palpable mass or hematoma, CDI, no bleeding, no drainage from sites, BLE DP/PT pulses palpable   Extremities: FROM, warm  Castaneda catheter in place, clear yellow urine

## 2024-12-03 NOTE — PROGRESS NOTE ADULT - ASSESSMENT
59F pmhx of Spinal Stenosis, Obesity, GERD and AAA.  Now POD1 s/p scheduled Endovascular abdominal aneurysm repair on 12/2/24 with Dr. Enriquez, recovering well, no acute events overnight     Plan:  - Reg Diet, IVF  - SQH  - Pain control  - Activity: OOBAT  - f/u AM labs  - d/c leonel miller AM  - dispo planning next 1-2 days    Vascular surgery   [id number]

## 2024-12-03 NOTE — DISCHARGE NOTE PROVIDER - HOSPITAL COURSE
60 y/o female with past medical hx of Spinal Stenosis, Obesity, GERD and Abdominal Aortic Aneurism. Recent ER visit at [facility] for abdominal pain associated with  nausea, vomiting and diarrhea, treated for colitis (discharged on Flagyl po last dose on 11/16/24). Presenting to PST prior to scheduled Endovascular abdominal aneurysm repair on 12/2/24 with .     Pt was taken to the OR on 12/2/24, and is s/p endovascular repair of aortoiliac aneurysm. The patient tolerated the procedure well (see operative report for full details). Pt was transferred to the PACU in stable condition. In the PACU, the patient's pain was controlled and vitals stable. On POC, the patient was doing well. The patient was transferred to the surgical floor in stable condition. On POD #1, pt was stable and doing well. Pt's Whitney was discontinued on 12/2, and passed TOV. Once IV pain control dosing complete, pt was transitioned to oral Tylenol and Motrin with Oxycodone for breakthrough pain.    On the day of discharge, the patient's vitals are stable, pain is controlled, and voiding urine. Pt will f/u with Dr. Castillo in 1-2 weeks. Pt will f/u with PCP in 1-2 weeks. 60 y/o female with past medical hx of Spinal Stenosis, Obesity, GERD and Abdominal Aortic Aneurism. Recent ER visit at [facility] for abdominal pain associated with  nausea, vomiting and diarrhea, treated for colitis (discharged on Flagyl po last dose on 11/16/24). Presenting to PST prior to scheduled Endovascular abdominal aneurysm repair on 12/2/24 with .     Pt was taken to the OR on 12/2/24, and is s/p endovascular repair of aortoiliac aneurysm. The patient tolerated the procedure well (see operative report for full details). Pt was transferred to the PACU in stable condition. In the PACU, the patient's pain was controlled and vitals stable. On POC, the patient was doing well. The patient was transferred to the surgical floor in stable condition. On POD #1, pt was stable and doing well. Pt's Whitney was discontinued on 12/2, and passed TOV. Once IV pain control dosing complete, pt was transitioned to oral Tylenol and Motrin with Oxycodone for breakthrough pain.    On the day of discharge, the patient's vitals are stable, pain is controlled, and voiding urine. Pt will f/u with Dr. Enriquez in 1-2 weeks. Pt will f/u with PCP in 1-2 weeks.

## 2024-12-03 NOTE — CHART NOTE - NSCHARTNOTEFT_GEN_A_CORE
Patient seen at bedside.  Refusing IV access at this time.  Counseled on the importance of having IV access in the hospital in case of any adverse or emergent events happening.  Patient demonstrated understanding and still refusing IV access despite MD and multiple nursing attempts at counseling.     Vascular Surgery  [id number]
POST-OP NOTE    SYLVESTER WALLER | [id number] | [facility] PACU 22    Procedure: s/p EVAR of aortoiloac aneurysm    Subjective:  Patient seen and examined at bedside.  Recovering appropriately.  She says her pain is controlled on medications.  No nasuea or emesis.  Whitney in place.  She is able to move both her lower extremities.  Neurovascular intact.      Vital Signs Last 24 Hrs  T(C): 36.2 (02 Dec 2024 15:00), Max: 36.3 (02 Dec 2024 06:09)  T(F): 97.2 (02 Dec 2024 15:00), Max: 97.3 (02 Dec 2024 06:09)  HR: 73 (02 Dec 2024 16:30) (73 - 92)  BP: 120/65 (02 Dec 2024 16:00) (117/63 - 132/64)  BP(mean): 86 (02 Dec 2024 16:00) (84 - 90)  RR: 16 (02 Dec 2024 16:00) (16 - 16)  SpO2: 100% (02 Dec 2024 16:30) (92% - 100%)    Parameters below as of 02 Dec 2024 16:00    O2 Flow (L/min): 2    I&O's Summary    02 Dec 2024 07:01  -  02 Dec 2024 18:37  --------------------------------------------------------  IN: 10 mL / OUT: 250 mL / NET: -240 mL      PHYSICAL EXAM:  Gen: NAD  Resp: breathing easily, no stridor  CV: RRR  Abdomen: soft, nontender, nondistended  GI/: Left groin incision site cdi with some ecchymosis around incision site, no signs of hematoma,  Extremities: Bilateral DP/PT pulses easily palpable  Skin: Normal color, no rashes or lesions    A:  59F pmhx of  Spinal Stenosis, Obesity, GERD and  AAA.  Now s/p scheduled Endovascular abdominal aneurysm repair on 12/2/24 with .     Plan:  - Reg Diet, IVF  - SQH  - Pain control  - Flat for 4 hours post op  - PACU for 6 hours  - AM labs

## 2024-12-03 NOTE — DISCHARGE NOTE PROVIDER - NSDCMRMEDTOKEN_GEN_ALL_CORE_FT
clonazePAM 2 mg oral tablet: 1 tab(s) orally 2 times a day  Movantik 25 mg oral tablet: 1 tab(s) orally once a day  omeprazole 20 mg oral delayed release capsule: 1 cap(s) orally once a day  oxyCODONE 20 mg oral tablet: 1 tab(s) orally 3 times a day  triamterene-hydrochlorothiazide 37.5 mg-25 mg oral capsule: 1 cap(s) orally once a day   clonazePAM 2 mg oral tablet: 1 tab(s) orally 2 times a day  Movantik 25 mg oral tablet: 1 tab(s) orally once a day  omeprazole 20 mg oral delayed release capsule: 1 cap(s) orally once a day  oxyCODONE 5 mg oral tablet: 1 tab(s) orally every 4 hours as needed for Moderate Pain (4 - 6) MDD: 6  triamterene-hydrochlorothiazide 37.5 mg-25 mg oral capsule: 1 cap(s) orally once a day

## 2024-12-04 ENCOUNTER — TRANSCRIPTION ENCOUNTER (OUTPATIENT)
Age: 59
End: 2024-12-04

## 2024-12-04 VITALS
OXYGEN SATURATION: 94 % | TEMPERATURE: 98 F | DIASTOLIC BLOOD PRESSURE: 74 MMHG | HEART RATE: 94 BPM | RESPIRATION RATE: 18 BRPM | SYSTOLIC BLOOD PRESSURE: 121 MMHG

## 2024-12-04 LAB
ANION GAP SERPL CALC-SCNC: 12 MMOL/L — SIGNIFICANT CHANGE UP (ref 5–17)
BUN SERPL-MCNC: 12 MG/DL — SIGNIFICANT CHANGE UP (ref 7–23)
CALCIUM SERPL-MCNC: 8.9 MG/DL — SIGNIFICANT CHANGE UP (ref 8.4–10.5)
CHLORIDE SERPL-SCNC: 94 MMOL/L — LOW (ref 96–108)
CO2 SERPL-SCNC: 27 MMOL/L — SIGNIFICANT CHANGE UP (ref 22–31)
CREAT SERPL-MCNC: 0.72 MG/DL — SIGNIFICANT CHANGE UP (ref 0.5–1.3)
EGFR: 97 ML/MIN/1.73M2 — SIGNIFICANT CHANGE UP
GLUCOSE SERPL-MCNC: 113 MG/DL — HIGH (ref 70–99)
HCT VFR BLD CALC: 32.6 % — LOW (ref 34.5–45)
HGB BLD-MCNC: 10.4 G/DL — LOW (ref 11.5–15.5)
MAGNESIUM SERPL-MCNC: 1.8 MG/DL — SIGNIFICANT CHANGE UP (ref 1.6–2.6)
MCHC RBC-ENTMCNC: 31.6 PG — SIGNIFICANT CHANGE UP (ref 27–34)
MCHC RBC-ENTMCNC: 31.9 G/DL — LOW (ref 32–36)
MCV RBC AUTO: 99.1 FL — SIGNIFICANT CHANGE UP (ref 80–100)
NRBC # BLD: 0 /100 WBCS — SIGNIFICANT CHANGE UP (ref 0–0)
PHOSPHATE SERPL-MCNC: 2.7 MG/DL — SIGNIFICANT CHANGE UP (ref 2.5–4.5)
PLATELET # BLD AUTO: 158 K/UL — SIGNIFICANT CHANGE UP (ref 150–400)
POTASSIUM SERPL-MCNC: 3.9 MMOL/L — SIGNIFICANT CHANGE UP (ref 3.5–5.3)
POTASSIUM SERPL-SCNC: 3.9 MMOL/L — SIGNIFICANT CHANGE UP (ref 3.5–5.3)
RBC # BLD: 3.29 M/UL — LOW (ref 3.8–5.2)
RBC # FLD: 13.3 % — SIGNIFICANT CHANGE UP (ref 10.3–14.5)
SODIUM SERPL-SCNC: 133 MMOL/L — LOW (ref 135–145)
WBC # BLD: 14.63 K/UL — HIGH (ref 3.8–10.5)
WBC # FLD AUTO: 14.63 K/UL — HIGH (ref 3.8–10.5)

## 2024-12-04 PROCEDURE — C1894: CPT

## 2024-12-04 PROCEDURE — 86901 BLOOD TYPING SEROLOGIC RH(D): CPT

## 2024-12-04 PROCEDURE — 82803 BLOOD GASES ANY COMBINATION: CPT

## 2024-12-04 PROCEDURE — 85014 HEMATOCRIT: CPT

## 2024-12-04 PROCEDURE — C1874: CPT

## 2024-12-04 PROCEDURE — C1769: CPT

## 2024-12-04 PROCEDURE — C1760: CPT

## 2024-12-04 PROCEDURE — 84295 ASSAY OF SERUM SODIUM: CPT

## 2024-12-04 PROCEDURE — 83605 ASSAY OF LACTIC ACID: CPT

## 2024-12-04 PROCEDURE — 85018 HEMOGLOBIN: CPT

## 2024-12-04 PROCEDURE — 85027 COMPLETE CBC AUTOMATED: CPT

## 2024-12-04 PROCEDURE — 76000 FLUOROSCOPY <1 HR PHYS/QHP: CPT

## 2024-12-04 PROCEDURE — 84132 ASSAY OF SERUM POTASSIUM: CPT

## 2024-12-04 PROCEDURE — 83735 ASSAY OF MAGNESIUM: CPT

## 2024-12-04 PROCEDURE — P9045: CPT

## 2024-12-04 PROCEDURE — 86923 COMPATIBILITY TEST ELECTRIC: CPT

## 2024-12-04 PROCEDURE — C1887: CPT

## 2024-12-04 PROCEDURE — 85025 COMPLETE CBC W/AUTO DIFF WBC: CPT

## 2024-12-04 PROCEDURE — 80048 BASIC METABOLIC PNL TOTAL CA: CPT

## 2024-12-04 PROCEDURE — 82330 ASSAY OF CALCIUM: CPT

## 2024-12-04 PROCEDURE — C1889: CPT

## 2024-12-04 PROCEDURE — 86850 RBC ANTIBODY SCREEN: CPT

## 2024-12-04 PROCEDURE — 82947 ASSAY GLUCOSE BLOOD QUANT: CPT

## 2024-12-04 PROCEDURE — 82435 ASSAY OF BLOOD CHLORIDE: CPT

## 2024-12-04 PROCEDURE — 84100 ASSAY OF PHOSPHORUS: CPT

## 2024-12-04 PROCEDURE — 86900 BLOOD TYPING SEROLOGIC ABO: CPT

## 2024-12-04 PROCEDURE — C2628: CPT

## 2024-12-04 PROCEDURE — C9399: CPT

## 2024-12-04 RX ORDER — OXYCODONE HYDROCHLORIDE 30 MG/1
1 TABLET ORAL
Qty: 12 | Refills: 0
Start: 2024-12-04 | End: 2024-12-05

## 2024-12-04 RX ORDER — SENNOSIDES 8.6 MG
2 TABLET ORAL ONCE
Refills: 0 | Status: COMPLETED | OUTPATIENT
Start: 2024-12-04 | End: 2024-12-04

## 2024-12-04 RX ORDER — POLYETHYLENE GLYCOL 3350 17 G/17G
17 POWDER, FOR SOLUTION ORAL EVERY 12 HOURS
Refills: 0 | Status: DISCONTINUED | OUTPATIENT
Start: 2024-12-04 | End: 2024-12-04

## 2024-12-04 RX ORDER — ACETAMINOPHEN 500MG 500 MG/1
650 TABLET, COATED ORAL ONCE
Refills: 0 | Status: COMPLETED | OUTPATIENT
Start: 2024-12-04 | End: 2024-12-04

## 2024-12-04 RX ORDER — SODIUM,POTASSIUM PHOSPHATES 278-250MG
1 POWDER IN PACKET (EA) ORAL ONCE
Refills: 0 | Status: COMPLETED | OUTPATIENT
Start: 2024-12-04 | End: 2024-12-04

## 2024-12-04 RX ORDER — NALOXONE HCL 0.4 MG/ML
1 AMPUL (ML) INJECTION
Qty: 1 | Refills: 0
Start: 2024-12-04

## 2024-12-04 RX ADMIN — PANTOPRAZOLE SODIUM 40 MILLIGRAM(S): 40 TABLET, DELAYED RELEASE ORAL at 05:24

## 2024-12-04 RX ADMIN — Medication 1 PACKET(S): at 10:11

## 2024-12-04 RX ADMIN — POLYETHYLENE GLYCOL 3350 17 GRAM(S): 17 POWDER, FOR SOLUTION ORAL at 10:11

## 2024-12-04 RX ADMIN — ACETAMINOPHEN 500MG 650 MILLIGRAM(S): 500 TABLET, COATED ORAL at 14:18

## 2024-12-04 RX ADMIN — ACETAMINOPHEN 500MG 650 MILLIGRAM(S): 500 TABLET, COATED ORAL at 13:18

## 2024-12-04 RX ADMIN — OXYCODONE HYDROCHLORIDE 10 MILLIGRAM(S): 30 TABLET ORAL at 15:16

## 2024-12-04 RX ADMIN — OXYCODONE HYDROCHLORIDE 10 MILLIGRAM(S): 30 TABLET ORAL at 01:20

## 2024-12-04 RX ADMIN — Medication 5000 UNIT(S): at 05:26

## 2024-12-04 RX ADMIN — OXYCODONE HYDROCHLORIDE 10 MILLIGRAM(S): 30 TABLET ORAL at 11:11

## 2024-12-04 RX ADMIN — OXYCODONE HYDROCHLORIDE 10 MILLIGRAM(S): 30 TABLET ORAL at 00:24

## 2024-12-04 RX ADMIN — DICLOFENAC SODIUM 2 GRAM(S): 20 SOLUTION TOPICAL at 00:24

## 2024-12-04 RX ADMIN — OXYCODONE HYDROCHLORIDE 10 MILLIGRAM(S): 30 TABLET ORAL at 14:52

## 2024-12-04 RX ADMIN — Medication 5000 UNIT(S): at 13:18

## 2024-12-04 RX ADMIN — OXYCODONE HYDROCHLORIDE 10 MILLIGRAM(S): 30 TABLET ORAL at 06:03

## 2024-12-04 RX ADMIN — NALOXEGOL OXALATE 25 MILLIGRAM(S): 12.5 TABLET, FILM COATED ORAL at 13:18

## 2024-12-04 RX ADMIN — OXYCODONE HYDROCHLORIDE 10 MILLIGRAM(S): 30 TABLET ORAL at 05:23

## 2024-12-04 RX ADMIN — OXYCODONE HYDROCHLORIDE 10 MILLIGRAM(S): 30 TABLET ORAL at 10:11

## 2024-12-04 NOTE — PROGRESS NOTE ADULT - ASSESSMENT
59F pmhx of spinal stenosis, obesity, GERD and AAA.  Now POD2 s/p scheduled endovascular abdominal aneurysm repair on 12/2/24 with Dr. Enriquez, recovering well, no acute events overnight     Plan:  - Reg Diet  - SQH  - Pain control  - Activity: OOBAT  - f/u AM labs  - dispo planning next 1-2 days    Vascular surgery   [id number] 59F pmhx of spinal stenosis, obesity, GERD and AAA.  Now POD2 s/p scheduled endovascular abdominal aneurysm repair on 12/2/24 with Dr. Enriquez, recovering well, no acute events overnight     Plan:  - Reg Diet  - SQH  - Pain control  - Activity: OOBAT  - f/u AM labs  - dispo planning    Vascular surgery   [id number]

## 2024-12-04 NOTE — DISCHARGE NOTE NURSING/CASE MANAGEMENT/SOCIAL WORK - FINANCIAL ASSISTANCE
[facility] provides services at a reduced cost to those who are determined to be eligible through [facility]’s financial assistance program. Information regarding [facility]’s financial assistance program can be found by going to [email or web address] or by calling [phone].

## 2024-12-04 NOTE — DISCHARGE NOTE NURSING/CASE MANAGEMENT/SOCIAL WORK - PATIENT PORTAL LINK FT
You can access the FollowMyHealth Patient Portal offered by [facility] by registering at the following website: [email or web address]. By joining [employer]’s FollowMyHealth portal, you will also be able to view your health information using other applications (apps) compatible with our system.

## 2024-12-04 NOTE — PROGRESS NOTE ADULT - SUBJECTIVE AND OBJECTIVE BOX
Vascular Surgery Progress Note (pg [id number])    SUBJECTIVE  The patient was seen and examined. No acute events overnight. Pain controlled, afebrile w/ stable vitals. Refused IV ON.    OBJECTIVE  ___________________________________________________  VITAL SIGNS / I&O's   Vital Signs Last 24 Hrs  T(C): 37.5 (04 Dec 2024 05:23), Max: 37.5 (04 Dec 2024 05:23)  T(F): 99.5 (04 Dec 2024 05:23), Max: 99.5 (04 Dec 2024 05:23)  HR: 92 (04 Dec 2024 05:23) (76 - 92)  BP: 105/68 (04 Dec 2024 05:23) (101/66 - 131/84)  BP(mean): --  RR: 18 (04 Dec 2024 05:23) (18 - 18)  SpO2: 93% (04 Dec 2024 05:23) (93% - 96%)    Parameters below as of 04 Dec 2024 05:23  Patient On (Oxygen Delivery Method): room air          02 Dec 2024 07:01  -  03 Dec 2024 07:00  --------------------------------------------------------  IN:    IV PiggyBack: 200 mL    Lactated Ringers: 600 mL    Oral Fluid: 440 mL  Total IN: 1240 mL    OUT:    Indwelling Catheter - Urethral (mL): 3060 mL  Total OUT: 3060 mL    Total NET: -1820 mL      03 Dec 2024 07:01  -  04 Dec 2024 05:41  --------------------------------------------------------  IN:    Oral Fluid: 1260 mL  Total IN: 1260 mL    OUT:    Voided (mL): 400 mL  Total OUT: 400 mL    Total NET: 860 mL        ___________________________________________________  PHYSICAL EXAM    Constitutional: well developed, well nourished, NAD  Respiratory: non labored breathing noted  Gastrointestinal: abdomen soft, nontender, nondistended. No obvious masses. No peritonitis  L Groin site w/ ecchymosis, soft, no palpable mass or hematoma, CDI, no bleeding, no drainage from sites, BLE DP/PT pulses palpable   Extremities: FROM, warm    ___________________________________________________  LABS                        10.3   15.91 )-----------( 133      ( 03 Dec 2024 12:12 )             32.2     03 Dec 2024 12:12    136    |  98     |  13     ----------------------------<  154    3.7     |  24     |  0.67     Ca    8.4        03 Dec 2024 12:12        CAPILLARY BLOOD GLUCOSE            Urinalysis Basic - ( 03 Dec 2024 12:12 )    Color: x / Appearance: x / SG: x / pH: x  Gluc: 154 mg/dL / Ketone: x  / Bili: x / Urobili: x   Blood: x / Protein: x / Nitrite: x   Leuk Esterase: x / RBC: x / WBC x   Sq Epi: x / Non Sq Epi: x / Bacteria: x      ___________________________________________________  MICRO  Recent Cultures:    ___________________________________________________  MEDICATIONS  (STANDING):  heparin   Injectable 5000 Unit(s) SubCutaneous every 8 hours  naloxegol 25 milliGRAM(s) Oral daily  pantoprazole    Tablet 40 milliGRAM(s) Oral before breakfast  triamterene 37.5 mG/hydrochlorothiazide 25 mG Tablet 1 Tablet(s) Oral daily    MEDICATIONS  (PRN):  diclofenac sodium 1% Gel 2 Gram(s) Topical four times a day PRN Mild Pain  oxyCODONE    IR 5 milliGRAM(s) Oral every 4 hours PRN Moderate Pain (4 - 6)  oxyCODONE    IR 10 milliGRAM(s) Oral every 4 hours PRN Severe Pain (7 - 10)   Vascular Surgery Progress Note (pg [id number])    SUBJECTIVE  The patient was seen and examined. No acute events overnight. Pain controlled, afebrile w/ stable vitals. Refused IV ON.    OBJECTIVE  ___________________________________________________  VITAL SIGNS / I&O's   Vital Signs Last 24 Hrs  T(C): 37.5 (04 Dec 2024 05:23), Max: 37.5 (04 Dec 2024 05:23)  T(F): 99.5 (04 Dec 2024 05:23), Max: 99.5 (04 Dec 2024 05:23)  HR: 92 (04 Dec 2024 05:23) (76 - 92)  BP: 105/68 (04 Dec 2024 05:23) (101/66 - 131/84)  BP(mean): --  RR: 18 (04 Dec 2024 05:23) (18 - 18)  SpO2: 93% (04 Dec 2024 05:23) (93% - 96%)    Parameters below as of 04 Dec 2024 05:23  Patient On (Oxygen Delivery Method): room air          02 Dec 2024 07:01  -  03 Dec 2024 07:00  --------------------------------------------------------  IN:    IV PiggyBack: 200 mL    Lactated Ringers: 600 mL    Oral Fluid: 440 mL  Total IN: 1240 mL    OUT:    Indwelling Catheter - Urethral (mL): 3060 mL  Total OUT: 3060 mL    Total NET: -1820 mL      03 Dec 2024 07:01  -  04 Dec 2024 05:41  --------------------------------------------------------  IN:    Oral Fluid: 1260 mL  Total IN: 1260 mL    OUT:    Voided (mL): 400 mL  Total OUT: 400 mL    Total NET: 860 mL        ___________________________________________________  PHYSICAL EXAM    Constitutional: well developed, well nourished, NAD  Respiratory: non labored breathing noted  Gastrointestinal: abdomen soft, nontender, nondistended. No obvious masses. No peritonitis  Groin and lower abdomen w/ ecchymosis, soft, no palpable collections. L groin incision c/d/i dermabond intact. BLE DP/PT pulses palpable   Extremities: FROM, warm    ___________________________________________________  LABS                        10.3   15.91 )-----------( 133      ( 03 Dec 2024 12:12 )             32.2     03 Dec 2024 12:12    136    |  98     |  13     ----------------------------<  154    3.7     |  24     |  0.67     Ca    8.4        03 Dec 2024 12:12        CAPILLARY BLOOD GLUCOSE            Urinalysis Basic - ( 03 Dec 2024 12:12 )    Color: x / Appearance: x / SG: x / pH: x  Gluc: 154 mg/dL / Ketone: x  / Bili: x / Urobili: x   Blood: x / Protein: x / Nitrite: x   Leuk Esterase: x / RBC: x / WBC x   Sq Epi: x / Non Sq Epi: x / Bacteria: x      ___________________________________________________  MICRO  Recent Cultures:    ___________________________________________________  MEDICATIONS  (STANDING):  heparin   Injectable 5000 Unit(s) SubCutaneous every 8 hours  naloxegol 25 milliGRAM(s) Oral daily  pantoprazole    Tablet 40 milliGRAM(s) Oral before breakfast  triamterene 37.5 mG/hydrochlorothiazide 25 mG Tablet 1 Tablet(s) Oral daily    MEDICATIONS  (PRN):  diclofenac sodium 1% Gel 2 Gram(s) Topical four times a day PRN Mild Pain  oxyCODONE    IR 5 milliGRAM(s) Oral every 4 hours PRN Moderate Pain (4 - 6)  oxyCODONE    IR 10 milliGRAM(s) Oral every 4 hours PRN Severe Pain (7 - 10)

## 2024-12-04 NOTE — DISCHARGE NOTE NURSING/CASE MANAGEMENT/SOCIAL WORK - NSDCPEFALRISK_GEN_ALL_CORE
For information on Fall & Injury Prevention, visit: [email or web address] OR  [email or web address] OR  https://www.cdc.gov/steadi/patient.html

## 2024-12-05 ENCOUNTER — NON-APPOINTMENT (OUTPATIENT)
Age: 59
End: 2024-12-05

## 2024-12-05 DIAGNOSIS — I25.10 ATHEROSCLEROTIC HEART DISEASE OF NATIVE CORONARY ARTERY W/OUT ANGINA PECTORIS: ICD-10-CM

## 2024-12-05 DIAGNOSIS — D72.829 ELEVATED WHITE BLOOD CELL COUNT, UNSPECIFIED: ICD-10-CM

## 2024-12-05 RX ORDER — ASPIRIN 81 MG/1
81 TABLET, COATED ORAL
Refills: 0 | Status: ACTIVE | COMMUNITY
Start: 2024-12-05

## 2024-12-05 RX ORDER — ROSUVASTATIN CALCIUM 5 MG/1
5 TABLET, FILM COATED ORAL
Qty: 90 | Refills: 1 | Status: ACTIVE | COMMUNITY
Start: 2024-12-05 | End: 1900-01-01

## 2024-12-05 RX ORDER — CIPROFLOXACIN HCL 750 MG
1 TABLET ORAL
Qty: 14 | Refills: 0
Start: 2024-12-05 | End: 2024-12-11

## 2024-12-05 RX ORDER — OXYCODONE AND ACETAMINOPHEN 5; 325 MG/1; MG/1
1 TABLET ORAL
Qty: 28 | Refills: 0
Start: 2024-12-05 | End: 2024-12-11

## 2024-12-06 ENCOUNTER — NON-APPOINTMENT (OUTPATIENT)
Age: 59
End: 2024-12-06

## 2024-12-06 LAB
BASOPHILS # BLD AUTO: 0.1 K/UL
BASOPHILS NFR BLD AUTO: 0.8 %
EOSINOPHIL # BLD AUTO: 0.76 K/UL
EOSINOPHIL NFR BLD AUTO: 6.3 %
HCT VFR BLD CALC: 49.3 %
HGB BLD-MCNC: 15.8 G/DL
IMM GRANULOCYTES NFR BLD AUTO: 0.8 %
LYMPHOCYTES # BLD AUTO: 3.14 K/UL
LYMPHOCYTES NFR BLD AUTO: 25.8 %
MAN DIFF?: NORMAL
MCHC RBC-ENTMCNC: 31.2 PG
MCHC RBC-ENTMCNC: 32 G/DL
MCV RBC AUTO: 97.4 FL
MONOCYTES # BLD AUTO: 0.74 K/UL
MONOCYTES NFR BLD AUTO: 6.1 %
NEUTROPHILS # BLD AUTO: 7.31 K/UL
NEUTROPHILS NFR BLD AUTO: 60.2 %
PLATELET # BLD AUTO: 251 K/UL
RBC # BLD: 5.06 M/UL
RBC # FLD: 13.7 %
WBC # FLD AUTO: 12.15 K/UL

## 2024-12-09 ENCOUNTER — NON-APPOINTMENT (OUTPATIENT)
Age: 59
End: 2024-12-09

## 2024-12-10 ENCOUNTER — NON-APPOINTMENT (OUTPATIENT)
Age: 59
End: 2024-12-10

## 2024-12-12 ENCOUNTER — APPOINTMENT (OUTPATIENT)
Dept: VASCULAR SURGERY | Facility: CLINIC | Age: 59
End: 2024-12-12
Payer: COMMERCIAL

## 2024-12-12 ENCOUNTER — APPOINTMENT (OUTPATIENT)
Dept: INTERNAL MEDICINE | Facility: CLINIC | Age: 59
End: 2024-12-12
Payer: COMMERCIAL

## 2024-12-12 VITALS
RESPIRATION RATE: 16 BRPM | HEART RATE: 97 BPM | TEMPERATURE: 97.9 F | OXYGEN SATURATION: 98 % | DIASTOLIC BLOOD PRESSURE: 76 MMHG | WEIGHT: 182 LBS | BODY MASS INDEX: 29.25 KG/M2 | HEIGHT: 66 IN | SYSTOLIC BLOOD PRESSURE: 112 MMHG

## 2024-12-12 VITALS
HEIGHT: 66 IN | TEMPERATURE: 98.2 F | DIASTOLIC BLOOD PRESSURE: 79 MMHG | HEART RATE: 89 BPM | SYSTOLIC BLOOD PRESSURE: 117 MMHG | WEIGHT: 182 LBS | BODY MASS INDEX: 29.25 KG/M2

## 2024-12-12 DIAGNOSIS — I71.40 ABDOMINAL AORTIC ANEURYSM, WITHOUT RUPTURE, UNSPECIFIED: ICD-10-CM

## 2024-12-12 DIAGNOSIS — Z09 ENCOUNTER FOR FOLLOW-UP EXAMINATION AFTER COMPLETED TREATMENT FOR CONDITIONS OTHER THAN MALIGNANT NEOPLASM: ICD-10-CM

## 2024-12-12 PROCEDURE — 99496 TRANSJ CARE MGMT HIGH F2F 7D: CPT

## 2024-12-12 PROCEDURE — 99024 POSTOP FOLLOW-UP VISIT: CPT

## 2024-12-12 PROCEDURE — 93925 LOWER EXTREMITY STUDY: CPT

## 2024-12-26 ENCOUNTER — APPOINTMENT (OUTPATIENT)
Dept: VASCULAR SURGERY | Facility: CLINIC | Age: 59
End: 2024-12-26

## 2025-01-10 ENCOUNTER — APPOINTMENT (OUTPATIENT)
Dept: CARDIOLOGY | Facility: CLINIC | Age: 60
End: 2025-01-10

## 2025-01-16 ENCOUNTER — APPOINTMENT (OUTPATIENT)
Dept: VASCULAR SURGERY | Facility: CLINIC | Age: 60
End: 2025-01-16

## 2025-03-05 ENCOUNTER — NON-APPOINTMENT (OUTPATIENT)
Age: 60
End: 2025-03-05

## 2025-03-14 ENCOUNTER — APPOINTMENT (OUTPATIENT)
Dept: VASCULAR SURGERY | Facility: CLINIC | Age: 60
End: 2025-03-14
Payer: COMMERCIAL

## 2025-03-14 VITALS
WEIGHT: 183 LBS | DIASTOLIC BLOOD PRESSURE: 89 MMHG | TEMPERATURE: 98.6 F | SYSTOLIC BLOOD PRESSURE: 135 MMHG | BODY MASS INDEX: 29.41 KG/M2 | HEART RATE: 96 BPM | HEIGHT: 66 IN

## 2025-03-14 VITALS — SYSTOLIC BLOOD PRESSURE: 138 MMHG | DIASTOLIC BLOOD PRESSURE: 83 MMHG | HEART RATE: 96 BPM

## 2025-03-14 DIAGNOSIS — I71.40 ABDOMINAL AORTIC ANEURYSM, WITHOUT RUPTURE, UNSPECIFIED: ICD-10-CM

## 2025-03-14 PROCEDURE — 93978 VASCULAR STUDY: CPT

## 2025-03-14 PROCEDURE — 99213 OFFICE O/P EST LOW 20 MIN: CPT

## 2025-05-16 ENCOUNTER — APPOINTMENT (OUTPATIENT)
Age: 60
End: 2025-05-16

## 2025-05-16 VITALS
OXYGEN SATURATION: 95 % | HEART RATE: 93 BPM | DIASTOLIC BLOOD PRESSURE: 80 MMHG | BODY MASS INDEX: 31.18 KG/M2 | HEIGHT: 66 IN | SYSTOLIC BLOOD PRESSURE: 140 MMHG | TEMPERATURE: 97.4 F | RESPIRATION RATE: 16 BRPM | WEIGHT: 194 LBS

## 2025-05-27 ENCOUNTER — RX RENEWAL (OUTPATIENT)
Age: 60
End: 2025-05-27

## 2025-06-02 ENCOUNTER — RX RENEWAL (OUTPATIENT)
Age: 60
End: 2025-06-02

## 2025-06-09 ENCOUNTER — APPOINTMENT (OUTPATIENT)
Dept: SURGERY | Facility: CLINIC | Age: 60
End: 2025-06-09
Payer: COMMERCIAL

## 2025-06-09 VITALS
DIASTOLIC BLOOD PRESSURE: 98 MMHG | HEART RATE: 99 BPM | TEMPERATURE: 98.6 F | WEIGHT: 190 LBS | HEIGHT: 66 IN | SYSTOLIC BLOOD PRESSURE: 154 MMHG | BODY MASS INDEX: 30.53 KG/M2

## 2025-06-09 PROBLEM — Z87.19 HISTORY OF LEFT INGUINAL HERNIA: Status: RESOLVED | Noted: 2025-06-09 | Resolved: 2025-06-09

## 2025-06-09 PROCEDURE — 99203 OFFICE O/P NEW LOW 30 MIN: CPT

## 2025-06-17 ENCOUNTER — OUTPATIENT (OUTPATIENT)
Dept: OUTPATIENT SERVICES | Facility: HOSPITAL | Age: 60
LOS: 1 days | End: 2025-06-17

## 2025-06-17 VITALS
SYSTOLIC BLOOD PRESSURE: 119 MMHG | WEIGHT: 193.35 LBS | RESPIRATION RATE: 18 BRPM | HEART RATE: 83 BPM | TEMPERATURE: 99 F | OXYGEN SATURATION: 95 % | HEIGHT: 66 IN | DIASTOLIC BLOOD PRESSURE: 82 MMHG

## 2025-06-17 DIAGNOSIS — Z98.890 OTHER SPECIFIED POSTPROCEDURAL STATES: Chronic | ICD-10-CM

## 2025-06-17 DIAGNOSIS — Z87.19 PERSONAL HISTORY OF OTHER DISEASES OF THE DIGESTIVE SYSTEM: ICD-10-CM

## 2025-06-17 DIAGNOSIS — Z01.818 ENCOUNTER FOR OTHER PREPROCEDURAL EXAMINATION: ICD-10-CM

## 2025-06-17 LAB
ANION GAP SERPL CALC-SCNC: 5 MMOL/L — SIGNIFICANT CHANGE UP (ref 5–17)
BASOPHILS # BLD AUTO: 0.08 K/UL — SIGNIFICANT CHANGE UP (ref 0–0.2)
BASOPHILS NFR BLD AUTO: 0.8 % — SIGNIFICANT CHANGE UP (ref 0–2)
BUN SERPL-MCNC: 14 MG/DL — SIGNIFICANT CHANGE UP (ref 7–23)
CALCIUM SERPL-MCNC: 8.9 MG/DL — SIGNIFICANT CHANGE UP (ref 8.5–10.1)
CHLORIDE SERPL-SCNC: 104 MMOL/L — SIGNIFICANT CHANGE UP (ref 96–108)
CO2 SERPL-SCNC: 28 MMOL/L — SIGNIFICANT CHANGE UP (ref 22–31)
CREAT SERPL-MCNC: 0.78 MG/DL — SIGNIFICANT CHANGE UP (ref 0.5–1.3)
EGFR: 89 ML/MIN/1.73M2 — SIGNIFICANT CHANGE UP
EGFR: 89 ML/MIN/1.73M2 — SIGNIFICANT CHANGE UP
EOSINOPHIL # BLD AUTO: 0.19 K/UL — SIGNIFICANT CHANGE UP (ref 0–0.5)
EOSINOPHIL NFR BLD AUTO: 1.9 % — SIGNIFICANT CHANGE UP (ref 0–6)
GLUCOSE SERPL-MCNC: 108 MG/DL — HIGH (ref 70–99)
HCT VFR BLD CALC: 51.5 % — HIGH (ref 34.5–45)
HGB BLD-MCNC: 17 G/DL — HIGH (ref 11.5–15.5)
IMM GRANULOCYTES NFR BLD AUTO: 0.4 % — SIGNIFICANT CHANGE UP (ref 0–0.9)
LYMPHOCYTES # BLD AUTO: 2.86 K/UL — SIGNIFICANT CHANGE UP (ref 1–3.3)
LYMPHOCYTES # BLD AUTO: 28.4 % — SIGNIFICANT CHANGE UP (ref 13–44)
MCHC RBC-ENTMCNC: 29.9 PG — SIGNIFICANT CHANGE UP (ref 27–34)
MCHC RBC-ENTMCNC: 33 G/DL — SIGNIFICANT CHANGE UP (ref 32–36)
MCV RBC AUTO: 90.7 FL — SIGNIFICANT CHANGE UP (ref 80–100)
MONOCYTES # BLD AUTO: 0.49 K/UL — SIGNIFICANT CHANGE UP (ref 0–0.9)
MONOCYTES NFR BLD AUTO: 4.9 % — SIGNIFICANT CHANGE UP (ref 2–14)
NEUTROPHILS # BLD AUTO: 6.41 K/UL — SIGNIFICANT CHANGE UP (ref 1.8–7.4)
NEUTROPHILS NFR BLD AUTO: 63.6 % — SIGNIFICANT CHANGE UP (ref 43–77)
NRBC BLD AUTO-RTO: 0 /100 WBCS — SIGNIFICANT CHANGE UP (ref 0–0)
PLATELET # BLD AUTO: 215 K/UL — SIGNIFICANT CHANGE UP (ref 150–400)
POTASSIUM SERPL-MCNC: 3.4 MMOL/L — LOW (ref 3.5–5.3)
POTASSIUM SERPL-SCNC: 3.4 MMOL/L — LOW (ref 3.5–5.3)
RBC # BLD: 5.68 M/UL — HIGH (ref 3.8–5.2)
RBC # FLD: 14.6 % — HIGH (ref 10.3–14.5)
SODIUM SERPL-SCNC: 137 MMOL/L — SIGNIFICANT CHANGE UP (ref 135–145)
WBC # BLD: 10.07 K/UL — SIGNIFICANT CHANGE UP (ref 3.8–10.5)
WBC # FLD AUTO: 10.07 K/UL — SIGNIFICANT CHANGE UP (ref 3.8–10.5)

## 2025-06-17 NOTE — H&P PST ADULT - NSICDXPASTMEDICALHX_GEN_ALL_CORE_FT
PAST MEDICAL HISTORY:  Abdominal aortic aneurysm without rupture     Anxiety     Current smoker     GERD (gastroesophageal reflux disease)

## 2025-06-17 NOTE — H&P PST ADULT - ASSESSMENT
left inguinal hernia  repair   CAPRINI SCORE    AGE RELATED RISK FACTORS                                                             [x ] Age 41-60 years                                            (1 Point)  [ ] Age: 61-74 years                                           (2 Points)                 [ ] Age= 75 years                                                (3 Points)             DISEASE RELATED RISK FACTORS                                                       [ ] Edema in the lower extremities                 (1 Point)                     [ ] Varicose veins                                               (1 Point)                                 [x ] BMI > 25 Kg/m2                                            (1 Point)                                  [ ] Serious infection (ie PNA)                            (1 Point)                     [ ] Lung disease ( COPD, Emphysema)            (1 Point)                                                                          [ ] Acute myocardial infarction                         (1 Point)                  [ ] Congestive heart failure (in the previous month)  (1 Point)         [ ] Inflammatory bowel disease                            (1 Point)                  [ ] Central venous access, PICC or Port               (2 points)       (within the last month)                                                                [ ] Stroke (in the previous month)                        (5 Points)    [ ] Previous or present malignancy                       (2 points)                                                                                                                                                         HEMATOLOGY RELATED FACTORS                                                         [ ] Prior episodes of VTE                                     (3 Points)                     [ ] Positive family history for VTE                      (3 Points)                  [ ] Prothrombin 29653 A                                     (3 Points)                     [ ] Factor V Leiden                                                (3 Points)                        [ ] Lupus anticoagulants                                      (3 Points)                                                           [ ] Anticardiolipin antibodies                              (3 Points)                                                       [ ] High homocysteine in the blood                   (3 Points)                                             [ ] Other congenital or acquired thrombophilia      (3 Points)                                                [ ] Heparin induced thrombocytopenia                  (3 Points)                                        MOBILITY RELATED FACTORS  [ ] Bed rest                                                         (1 Point)  [ ] Plaster cast                                                    (2 points)  [ ] Bed bound for more than 72 hours           (2 Points)    GENDER SPECIFIC FACTORS  [ ] Pregnancy or had a baby within the last month   (1 Point)  [ ] Post-partum < 6 weeks                                   (1 Point)  [ ] Hormonal therapy  or oral contraception   (1 Point)  [ ] History of pregnancy complications              (1 point)  [ ] Unexplained or recurrent              (1 Point)    OTHER RISK FACTORS                                           (1 Point)  [ ] BMI >40, smoking, diabetes requiring insulin, chemotherapy  blood transfusions and length of surgery over 2 hours    SURGERY RELATED RISK FACTORS  [ ]  Section within the last month     (1 Point)  [ ] Minor surgery                                                  (1 Point)  [ ] Arthroscopic surgery                                       (2 Points)  [ x] Planned major surgery lasting more            (2 Points)      than 45 minutes     [ ] Elective hip or knee joint replacement       (5 points)       surgery                                                TRAUMA RELATED RISK FACTORS  [ ] Fracture of the hip, pelvis, or leg                       (5 Points)  [ ] Spinal cord injury resulting in paralysis             (5 points)       (in the previous month)    [ ] Paralysis  (less than 1 month)                             (5 Points)  [ ] Multiple Trauma within 1 month                        (5 Points)    Total Score [       4 ]    Caprini Score 0-2: Low Risk, NO VTE prophylaxis required for most patients, encourage ambulation  Caprini Score 3-6: Moderate Risk , pharmacologic VTE prophylaxis is indicated for most patients (in the absence of contraindications)  Caprini Score Greater than or =7: High risk, pharmocologic VTE prophylaxis indicated for most patients (in the absence of contraindications)

## 2025-06-17 NOTE — H&P PST ADULT - ALCOHOL USE HISTORY SINGLE SELECT
[facility] - Emergency Department  Hospital Medicine  Consult Note    Patient Name: Syed Kelley  MRN: [id number]  Admission Date: 2/17/2023  Hospital Length of Stay: 0 days  Attending Physician: Yael Cohen MD   Primary Care Provider: Julio Cruz DO           Patient information was obtained from patient, past medical records and ER records.     Consults  Subjective:     Principal Problem: Dry gangrene    Chief Complaint:   Chief Complaint   Patient presents with    Wound Infection     Bilateral feet. Sent from doctor for gangrene.         HPI: 60yowm with PMH of HTN, DM, HLD presents for 3 weeks of worsening necrotic areas on the toes bilaterally.  This began several weeks ago and has become progressively worse.  Reports enlarging black areas worse on the tips of his great toes bilaterally.  He denies any systemic symptoms.  Was on clindamycin OP.  Denies pain, denies neuropathy, no f/c, cp, sob, wheeze, cough.  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  ROS otherwise negative.       Past Medical History:   Diagnosis Date    Adult attention deficit disorder 3/22/2021    Controlled type 2 diabetes mellitus with hyperglycemia, without long-term current use of insulin 3/22/2021    Disorder of kidney and ureter     Essential hypertension 3/22/2021    Hyperlipidemia        Past Surgical History:   Procedure Laterality Date    APPENDECTOMY      CARDIAC CATHETERIZATION      LEFT HEART CATHETERIZATION N/A 5/7/2021    Procedure: Left heart cath with possible intervention;  Surgeon: Federico James DO;  Location: [facility] CATH LAB;  Service: Cardiology;  Laterality: N/A;       Review of patient's allergies indicates:   Allergen Reactions    Azithromycin     M-mycin        No current facility-administered medications on file prior to encounter.     Current Outpatient Medications on File Prior to Encounter   Medication Sig    albuterol (PROVENTIL/VENTOLIN HFA) 90 mcg/actuation  "inhaler Inhale 2 puffs into the lungs every 4 (four) hours as needed for Shortness of Breath.    aspirin (ECOTRIN) 81 MG EC tablet Take 1 tablet (81 mg total) by mouth once daily.    atorvastatin (LIPITOR) 80 MG tablet Take 1 tablet (80 mg total) by mouth once daily.    cholecalciferol, vitamin D3, (VITAMIN D3) 50 mcg (2,000 unit) Cap Take 1 capsule by mouth once daily.    citalopram (CELEXA) 20 MG tablet Take 1 tablet (20 mg total) by mouth once daily.    clindamycin (CLEOCIN) 150 MG capsule Take 2 capsules (300 mg total) by mouth 4 (four) times daily. for 7 days    dextroamphetamine-amphetamine (ADDERALL XR) 20 MG 24 hr capsule Take 1 capsule (20 mg total) by mouth 2 (two) times a day.    dextroamphetamine-amphetamine (ADDERALL XR) 30 MG 24 hr capsule Take 1 capsule by mouth every morning and 1 capsule by mouth at 1:00 pm.    docosahexaenoic acid/epa (FISH OIL ORAL) Take 1 capsule by mouth once daily.    glyBURIDE (DIABETA) 5 MG tablet Take 1 tablet (5 mg total) by mouth daily with breakfast.    insulin degludec (TRESIBA FLEXTOUCH U-200) 200 unit/mL (3 mL) insulin pen Inject 26 Units into the skin once daily.    insulin syringe-needle U-100 0.3 mL 31 gauge x 5/16" Syrg Use once daily for insulin injection    lisinopriL-hydrochlorothiazide (PRINZIDE,ZESTORETIC) 20-25 mg Tab Take 1 tablet by mouth once daily.    metFORMIN (GLUCOPHAGE) 1000 MG tablet Take 1 tablet (1,000 mg total) by mouth 2 (two) times daily with meals.    metoprolol tartrate (LOPRESSOR) 50 MG tablet Take 1 tablet (50 mg total) by mouth 2 (two) times daily.    pen needle, diabetic 31 gauge x 5/16" Ndle Use to administer insulin twice daily    [DISCONTINUED] atorvastatin (LIPITOR) 80 MG tablet Take 1 tablet (80 mg total) by mouth once daily.    [DISCONTINUED] cetirizine (ZYRTEC) 10 MG tablet Take 1 tablet (10 mg total) by mouth every evening. for 7 days (Patient not taking: Reported on 12/22/2022)    [DISCONTINUED] famotidine " (PEPCID) 20 MG tablet Take 1 tablet (20 mg total) by mouth 2 (two) times daily. for 7 days (Patient not taking: Reported on 2022)    [DISCONTINUED] glyBURIDE (DIABETA) 5 MG tablet Take 1 tablet (5 mg total) by mouth daily with breakfast.    [DISCONTINUED] ipratropium-albuteroL (COMBIVENT)  mcg/actuation inhaler Inhale 1 puff into the lungs 4 (four) times daily. Rescue (Patient not taking: Reported on 2022)     Family History       Problem Relation (Age of Onset)    Cancer Sister    Hypertension Father    Stroke Father          Tobacco Use    Smoking status: Former     Packs/day: 5.00     Years: 4.00     Pack years: 20.00     Types: Cigarettes     Start date:      Quit date:      Years since quittin.1    Smokeless tobacco: Current     Types: Chew    Tobacco comments:     1 can/day   Substance and Sexual Activity    Alcohol use: Yes     Comment: 2-3 drinks per/3 months    Drug use: Never    Sexual activity: Yes     Partners: Female     Review of Systems   Constitutional:  Negative for chills, fatigue, fever and unexpected weight change.   HENT:  Negative for congestion, mouth sores and sore throat.    Eyes:  Negative for photophobia and visual disturbance.   Respiratory:  Negative for cough, chest tightness, shortness of breath and wheezing.    Cardiovascular:  Negative for chest pain, palpitations and leg swelling.   Gastrointestinal:  Negative for abdominal pain, diarrhea, nausea and vomiting.   Endocrine: Negative for cold intolerance and heat intolerance.   Genitourinary:  Negative for difficulty urinating, dysuria, frequency and urgency.   Musculoskeletal:  Negative for arthralgias, back pain and myalgias.   Skin:  Positive for wound (enlarging necrotic wounds to the great toes bilaterally.). Negative for pallor and rash.   Neurological:  Negative for tremors, seizures, syncope, weakness, numbness and headaches.   Hematological:  Does not bruise/bleed easily.    Psychiatric/Behavioral:  Negative for agitation, confusion, hallucinations and suicidal ideas.    Objective:     Vital Signs (Most Recent):  Temp: 98 °F (36.7 °C) (02/17/23 1208)  Pulse: 70 (02/17/23 1428)  Resp: 19 (02/17/23 1428)  BP: 127/83 (02/17/23 1428)  SpO2: 95 % (02/17/23 1428) Vital Signs (24h Range):  Temp:  [97.8 °F (36.6 °C)-98 °F (36.7 °C)] 98 °F (36.7 °C)  Pulse:  [68-70] 70  Resp:  [17-22] 19  SpO2:  [95 %-97 %] 95 %  BP: (124-128)/(78-83) 127/83     Weight: 83.9 kg (185 lb)  Body mass index is 32.77 kg/m².    Physical Exam  Vitals reviewed.   Constitutional:       Appearance: Normal appearance.   HENT:      Head: Normocephalic and atraumatic.      Nose: Nose normal.   Eyes:      Extraocular Movements: Extraocular movements intact.      Conjunctiva/sclera: Conjunctivae normal.   Neck:      Trachea: Trachea normal.   Cardiovascular:      Rate and Rhythm: Normal rate and regular rhythm.      Pulses: Normal pulses.      Heart sounds: Normal heart sounds.   Pulmonary:      Effort: Pulmonary effort is normal.      Breath sounds: Normal breath sounds and air entry.   Abdominal:      General: Bowel sounds are normal.      Palpations: Abdomen is soft.   Musculoskeletal:         General: Normal range of motion.      Cervical back: Neck supple.   Skin:     General: Skin is warm and dry.      Capillary Refill: Capillary refill takes less than 2 seconds.      Comments: Bilateral necrotic tips of the great toes.  Small areas of necrosis on other toes as well.   Neurological:      General: No focal deficit present.      Mental Status: He is alert and oriented to person, place, and time.      Cranial Nerves: Cranial nerves 2-12 are intact.      Comments: Grossly normal motor and sensory function without focal deficit appreciated.   Psychiatric:         Mood and Affect: Mood and affect normal.         Behavior: Behavior normal. Behavior is cooperative.         Thought Content: Thought content normal.        Significant Labs: All pertinent labs within the past 24 hours have been reviewed.    Significant Imaging: I have reviewed all pertinent imaging results/findings within the past 24 hours.    Assessment/Plan:     * Dry gangrene  Per vascular surgery      Obesity, diabetes, and hypertension syndrome  Body mass index is 32.77 kg/m². Morbid obesity complicates all aspects of disease management from diagnostic modalities to treatment. Weight loss encouraged and health benefits explained to patient.     Adjust hypertensives as indicated  Basal, ssi insulin    PAD (peripheral artery disease)  Asa/statin      Type 2 diabetes mellitus without complication, without long-term current use of insulin  Will shoot for tight control  Basal at home 26u daily along with metformin and sulfonyurea  Will keep on 20u levimir daily  SSI  Hold orals      VTE Risk Mitigation (From admission, onward)         Ordered     enoxaparin injection 40 mg  Daily         02/17/23 1531     IP VTE HIGH RISK PATIENT  Once         02/17/23 1531     Place sequential compression device  Until discontinued         02/17/23 1531                    Thank you for your consult. I will follow-up with patient. Please contact us if you have any additional questions.    Quincy Mcgowan DO  Department of Hospital Medicine   [facility] - Emergency Department     yes...

## 2025-06-17 NOTE — H&P PST ADULT - HISTORY OF PRESENT ILLNESS
58 yo female s/p aortic abdominal aneurysm  repair in 12/2024- since has developed a left inguinal hernia - scheduled for robotic assist  laparoscopic left inguinal hernia repair

## 2025-06-18 ENCOUNTER — APPOINTMENT (OUTPATIENT)
Dept: CARDIOLOGY | Facility: CLINIC | Age: 60
End: 2025-06-18
Payer: COMMERCIAL

## 2025-06-18 ENCOUNTER — APPOINTMENT (OUTPATIENT)
Dept: INTERNAL MEDICINE | Facility: CLINIC | Age: 60
End: 2025-06-18
Payer: COMMERCIAL

## 2025-06-18 VITALS
WEIGHT: 192 LBS | OXYGEN SATURATION: 97 % | HEIGHT: 66 IN | DIASTOLIC BLOOD PRESSURE: 100 MMHG | SYSTOLIC BLOOD PRESSURE: 140 MMHG | TEMPERATURE: 97.8 F | HEART RATE: 88 BPM | BODY MASS INDEX: 30.86 KG/M2

## 2025-06-18 VITALS
BODY MASS INDEX: 30.86 KG/M2 | DIASTOLIC BLOOD PRESSURE: 92 MMHG | OXYGEN SATURATION: 97 % | SYSTOLIC BLOOD PRESSURE: 140 MMHG | HEART RATE: 78 BPM | WEIGHT: 192 LBS | TEMPERATURE: 97.8 F | HEIGHT: 66 IN

## 2025-06-18 VITALS — DIASTOLIC BLOOD PRESSURE: 90 MMHG | SYSTOLIC BLOOD PRESSURE: 140 MMHG

## 2025-06-18 PROBLEM — Z01.818 PRE-OPERATIVE CLEARANCE: Status: ACTIVE | Noted: 2025-06-18

## 2025-06-18 PROBLEM — D75.1 ERYTHROCYTOSIS: Status: ACTIVE | Noted: 2025-06-18

## 2025-06-18 PROCEDURE — 93000 ELECTROCARDIOGRAM COMPLETE: CPT | Mod: NC

## 2025-06-18 PROCEDURE — 93000 ELECTROCARDIOGRAM COMPLETE: CPT

## 2025-06-18 PROCEDURE — 99214 OFFICE O/P EST MOD 30 MIN: CPT | Mod: 25

## 2025-06-18 PROCEDURE — 99214 OFFICE O/P EST MOD 30 MIN: CPT

## 2025-06-18 PROCEDURE — G2211 COMPLEX E/M VISIT ADD ON: CPT

## 2025-06-18 RX ORDER — POTASSIUM CHLORIDE 1500 MG/1
20 TABLET, FILM COATED, EXTENDED RELEASE ORAL
Qty: 30 | Refills: 1 | Status: ACTIVE | COMMUNITY
Start: 2025-06-18 | End: 1900-01-01

## 2025-06-19 ENCOUNTER — TRANSCRIPTION ENCOUNTER (OUTPATIENT)
Age: 60
End: 2025-06-19

## 2025-06-19 ENCOUNTER — APPOINTMENT (OUTPATIENT)
Dept: SURGERY | Facility: HOSPITAL | Age: 60
End: 2025-06-19

## 2025-06-19 ENCOUNTER — OUTPATIENT (OUTPATIENT)
Dept: OUTPATIENT SERVICES | Facility: HOSPITAL | Age: 60
LOS: 1 days | End: 2025-06-19
Payer: COMMERCIAL

## 2025-06-19 VITALS
TEMPERATURE: 98 F | DIASTOLIC BLOOD PRESSURE: 77 MMHG | OXYGEN SATURATION: 97 % | RESPIRATION RATE: 18 BRPM | SYSTOLIC BLOOD PRESSURE: 122 MMHG | HEART RATE: 70 BPM

## 2025-06-19 VITALS
WEIGHT: 190.04 LBS | OXYGEN SATURATION: 94 % | HEIGHT: 66 IN | TEMPERATURE: 98 F | SYSTOLIC BLOOD PRESSURE: 134 MMHG | DIASTOLIC BLOOD PRESSURE: 89 MMHG | RESPIRATION RATE: 17 BRPM | HEART RATE: 76 BPM

## 2025-06-19 DIAGNOSIS — Z98.890 OTHER SPECIFIED POSTPROCEDURAL STATES: Chronic | ICD-10-CM

## 2025-06-19 LAB
ALBUMIN SERPL ELPH-MCNC: 4.3 G/DL
ALP BLD-CCNC: 100 U/L
ALT SERPL-CCNC: 16 U/L
ANION GAP SERPL CALC-SCNC: 16 MMOL/L
APPEARANCE: CLEAR
AST SERPL-CCNC: 18 U/L
BACTERIA: NEGATIVE /HPF
BASOPHILS # BLD AUTO: 0.08 K/UL
BASOPHILS NFR BLD AUTO: 0.7 %
BILIRUB DIRECT SERPL-MCNC: 0.11 MG/DL
BILIRUB INDIRECT SERPL-MCNC: 0.2 MG/DL
BILIRUB SERPL-MCNC: 0.3 MG/DL
BILIRUBIN URINE: NEGATIVE
BLOOD URINE: ABNORMAL
BUN SERPL-MCNC: 14 MG/DL
CALCIUM SERPL-MCNC: 9.6 MG/DL
CAST: 0 /LPF
CHLORIDE SERPL-SCNC: 96 MMOL/L
CHOLEST SERPL-MCNC: 137 MG/DL
CK SERPL-CCNC: 67 U/L
CO2 SERPL-SCNC: 26 MMOL/L
COLOR: YELLOW
CREAT SERPL-MCNC: 0.79 MG/DL
EGFRCR SERPLBLD CKD-EPI 2021: 87 ML/MIN/1.73M2
EOSINOPHIL # BLD AUTO: 0.23 K/UL
EOSINOPHIL NFR BLD AUTO: 1.9 %
EPITHELIAL CELLS: 0 /HPF
ESTIMATED AVERAGE GLUCOSE: 123 MG/DL
GLUCOSE QUALITATIVE U: NEGATIVE MG/DL
GLUCOSE SERPL-MCNC: 76 MG/DL
HBA1C MFR BLD HPLC: 5.9 %
HCT VFR BLD CALC: 48.7 %
HDLC SERPL-MCNC: 32 MG/DL
HGB BLD-MCNC: 15.9 G/DL
IMM GRANULOCYTES NFR BLD AUTO: 0.3 %
KETONES URINE: NEGATIVE MG/DL
LDLC SERPL-MCNC: 70 MG/DL
LEUKOCYTE ESTERASE URINE: NEGATIVE
LYMPHOCYTES # BLD AUTO: 3.44 K/UL
LYMPHOCYTES NFR BLD AUTO: 29.2 %
MAGNESIUM SERPL-MCNC: 2.2 MG/DL
MAN DIFF?: NORMAL
MCHC RBC-ENTMCNC: 29.9 PG
MCHC RBC-ENTMCNC: 32.6 G/DL
MCV RBC AUTO: 91.5 FL
MICROSCOPIC-UA: NORMAL
MONOCYTES # BLD AUTO: 0.68 K/UL
MONOCYTES NFR BLD AUTO: 5.8 %
NEUTROPHILS # BLD AUTO: 7.34 K/UL
NEUTROPHILS NFR BLD AUTO: 62.1 %
NITRITE URINE: NEGATIVE
NONHDLC SERPL-MCNC: 105 MG/DL
PH URINE: 6
PLATELET # BLD AUTO: 210 K/UL
POTASSIUM SERPL-SCNC: 4.2 MMOL/L
PROT SERPL-MCNC: 7.1 G/DL
PROTEIN URINE: NEGATIVE MG/DL
RBC # BLD: 5.32 M/UL
RBC # FLD: 14.5 %
RED BLOOD CELLS URINE: 2 /HPF
SODIUM SERPL-SCNC: 138 MMOL/L
SPECIFIC GRAVITY URINE: 1.01
T3FREE SERPL-MCNC: 3.86 PG/ML
T4 FREE SERPL-MCNC: 1.3 NG/DL
TRIGL SERPL-MCNC: 211 MG/DL
TSH SERPL-ACNC: 2.13 UIU/ML
UROBILINOGEN URINE: 0.2 MG/DL
WBC # FLD AUTO: 11.8 K/UL
WHITE BLOOD CELLS URINE: 0 /HPF

## 2025-06-19 PROCEDURE — S2900 ROBOTIC SURGICAL SYSTEM: CPT | Mod: NC,AS

## 2025-06-19 PROCEDURE — 49593 RPR AA HRN 1ST 3-10 RDC: CPT | Mod: AS

## 2025-06-19 PROCEDURE — S2900 ROBOTIC SURGICAL SYSTEM: CPT | Mod: NC

## 2025-06-19 PROCEDURE — 49593 RPR AA HRN 1ST 3-10 RDC: CPT

## 2025-06-19 DEVICE — ETHICON HERNIA SECURESTRAP 5MM SIZE 25: Type: IMPLANTABLE DEVICE | Site: LEFT | Status: FUNCTIONAL

## 2025-06-19 DEVICE — MESH HERNIA INGUINAL PROGRIP LAPAROSCOPIC 15 X 10CM LEFT: Type: IMPLANTABLE DEVICE | Site: LEFT | Status: FUNCTIONAL

## 2025-06-19 RX ORDER — OXYCODONE HYDROCHLORIDE 30 MG/1
1 TABLET ORAL
Refills: 0 | DISCHARGE

## 2025-06-19 RX ORDER — DEXTROAMPHETAMINE SACCHARATE, AMPHETAMINE ASPARTATE MONOHYDRATE, DEXTROAMPHETAMINE SULFATE AND AMPHETAMINE SULFATE 2.5; 2.5; 2.5; 2.5 MG/1; MG/1; MG/1; MG/1
1 CAPSULE, EXTENDED RELEASE ORAL
Refills: 0 | DISCHARGE

## 2025-06-19 RX ORDER — SODIUM CHLORIDE 9 G/1000ML
1000 INJECTION, SOLUTION INTRAVENOUS
Refills: 0 | Status: DISCONTINUED | OUTPATIENT
Start: 2025-06-19 | End: 2025-06-19

## 2025-06-19 RX ORDER — HYDROMORPHONE/SOD CHLOR,ISO/PF 2 MG/10 ML
0.5 SYRINGE (ML) INJECTION
Refills: 0 | Status: DISCONTINUED | OUTPATIENT
Start: 2025-06-19 | End: 2025-06-19

## 2025-06-19 RX ADMIN — Medication 0.5 MILLIGRAM(S): at 13:18

## 2025-06-19 RX ADMIN — SODIUM CHLORIDE 75 MILLILITER(S): 9 INJECTION, SOLUTION INTRAVENOUS at 12:39

## 2025-06-19 NOTE — ASU PREOP CHECKLIST - BOWEL PREP
Patient called stating she spoke with someone about extending her disability  Patient is requesting a call back      Call back # [phone] n/a

## 2025-06-19 NOTE — BRIEF OPERATIVE NOTE - NSICDXBRIEFPROCEDURE_GEN_ALL_CORE_FT
PROCEDURES:  Repair of nonreducible anterior abdominal wall hernia without history of prior repair, with insertion of mesh, defect 3 cm to 10 cm in length 19-Jun-2025 12:40:38  Chester Coulter

## 2025-06-19 NOTE — ASU PATIENT PROFILE, ADULT - FALL HARM RISK - UNIVERSAL INTERVENTIONS
Bed in lowest position, wheels locked, appropriate side rails in place/Call bell, personal items and telephone in reach/Instruct patient to call for assistance before getting out of bed or chair/Non-slip footwear when patient is out of bed/[place] to call system/Physically safe environment - no spills, clutter or unnecessary equipment/Purposeful Proactive Rounding/Room/bathroom lighting operational, light cord in reach

## 2025-06-19 NOTE — ASU DISCHARGE PLAN (ADULT/PEDIATRIC) - FINANCIAL ASSISTANCE
[facility] provides services at a reduced cost to those who are determined to be eligible through [facility]’s financial assistance program. Information regarding [facility]’s financial assistance program can be found by going to [email or web address] or by calling [phone].

## 2025-06-19 NOTE — ASU DISCHARGE PLAN (ADULT/PEDIATRIC) - NS MD DC FALL RISK RISK
For information on Fall & Injury Prevention, visit: [email or web address] OR  [email or web address] OR  https://www.cdc.gov/steadi/patient.html

## 2025-06-22 PROBLEM — E87.6 HYPOKALEMIA: Status: ACTIVE | Noted: 2025-06-18

## 2025-06-22 PROBLEM — K43.2 INCISIONAL HERNIA: Status: ACTIVE | Noted: 2025-06-09

## 2025-06-22 PROBLEM — Z01.818 PREOP EXAM FOR INTERNAL MEDICINE: Status: ACTIVE | Noted: 2025-06-18

## 2025-06-25 DIAGNOSIS — K43.2 INCISIONAL HERNIA WITHOUT OBSTRUCTION OR GANGRENE: ICD-10-CM

## 2025-06-25 DIAGNOSIS — K40.90 UNILATERAL INGUINAL HERNIA, WITHOUT OBSTRUCTION OR GANGRENE, NOT SPECIFIED AS RECURRENT: ICD-10-CM

## 2025-06-25 DIAGNOSIS — F17.210 NICOTINE DEPENDENCE, CIGARETTES, UNCOMPLICATED: ICD-10-CM

## 2025-06-30 ENCOUNTER — APPOINTMENT (OUTPATIENT)
Dept: SURGERY | Facility: CLINIC | Age: 60
End: 2025-06-30
Payer: COMMERCIAL

## 2025-06-30 VITALS
DIASTOLIC BLOOD PRESSURE: 81 MMHG | SYSTOLIC BLOOD PRESSURE: 120 MMHG | BODY MASS INDEX: 30.37 KG/M2 | HEIGHT: 66 IN | HEART RATE: 87 BPM | TEMPERATURE: 98.8 F | WEIGHT: 189 LBS

## 2025-06-30 PROCEDURE — 99211 OFF/OP EST MAY X REQ PHY/QHP: CPT

## 2025-07-10 NOTE — PROGRESS NOTE ADULT - NS ATTEND BILL GEN_ALL_CORE
Internal Medicine Progress Note                                                                  Patient Name: Delma Rahman    YOB: 1949    MRN: [id number]         Date of Service: 7/10/2025    Subjective     No acute events overnight.  Patient sleeping on initial visit and lethargic on revisit.  Family notes continued abdominal discomfort and no bowel movement.    Objective     Visit Vitals  /63 (BP Location: LUE - Left upper extremity, Patient Position: Sitting)   Pulse (!) 135   Temp 99.2 °F (37.3 °C) (Oral)   Resp 16   Ht 5' 5\" (1.651 m)   Wt 70.1 kg (154 lb 8.7 oz)   SpO2 96%   BMI 25.72 kg/m²          Physical Exam  Physical Exam  Constitutional:       General: She is not in acute distress.     Appearance: She is not toxic-appearing or diaphoretic.      Comments: Awake, Sitting in chair   HENT:      Head: Normocephalic and atraumatic.   Pulmonary:      Effort: Pulmonary effort is normal. No respiratory distress.   Abdominal:      Comments: Mild TTP with decreased rigidity from yesterday, Pleurex bandaged in place   Musculoskeletal:      Right lower leg: Edema present.      Left lower leg: Edema present.      Comments: Bilateral edema with clear blisters on feet,    Skin:     General: Skin is warm and dry.   Neurological:      Mental Status: She is alert.         No intake or output data in the 24 hours ending 07/10/25 1645       Assessment and Plan   Delma Rahman is a 76 year old female PMH  HLD, HTN, COPD/Emphysema, GERD, ER negative R breast cancer s/p chemoradiation therapy in 2022 with chemotherapy induced neuropathy, tobacco use, presenting for sudden onset severe abdominal pain. She was then found to have extensive metastatic disease and on 7/1 opted for comfort care and DNR. Admitted to hospice service on 7/4 now pending coordination of home care.    #Diffuse Metastatic Disease  -CT abdomen pelvis consistent with extensive metastatic disease, moderate left pleural 
effusion, moderate large volume ascites, serosal tumor involving sigmoid colon which appears collapsed obstruction not excluded, pleural based nodules lymphadenopathy at epicardial fat bilaterally.  Retroperitoneal/left periaortic and mesenteric lymphadenopathy.  2.2 cm metastatic lesion at posterior hepatic lobe.  Asymmetric right breast thickening cannot exclude breast malignancy.  - 7/1 GOC discussion with SDM and patient opting for comfort care and hospice placement  - IR therapeutic paracentesis 7/2  - Pleurx placement 7/7     Plan  - Hospice team to manage pain regiment   - Fentanyl patch 100mcg   - Fentanyl patch 25mcg   - Tylenol 650mg PO Q4 PRN for mild pain  - dilaudid 2mg PO Q2 scheduled  - dilaudid 4mg PO Q2 PRN  - Comfort care order as needed including   - Scheduled docusate Sennosides BID   - One time mag citrate   - Decrease Seroquel to 12.5 mg   - Furosemide 20 mg    # L Pleural Effusion  # Ascites - SBP vs Secondary Peritonitis   # S/p IR thora and pleura  #Paroxysmal A-fib with RVR  #Type 2 MI  #Hypertension  #Hyperlipidemia   #Hx of GERD   #VIVIANE, resolved  #Hyperkalemia, resolved  #HAGMA, resolved  #Type 1 Lactic Acidosis, resolved      FENA  Fluids: PO  Electrolytes: comfort care  Nutrition: general diet  Activity: advance to baseline    PPx:  VTE: Not indicated  GI: not indicated    Code Status: DNR    Dispo: Discharge to home on hospice pending pain control and home coordination    Estimated Date of Discharge: 7/11/25    Of note estimated date of discharge is based on the patient's clinical status the day of this note. It is subject to change depending on medical or social factors.     Discussed with attending, Dr. Ridge Basilio MD  Internal Medicine Resident  Jaime [phone]  
Attending to bill
Attending to bill

## 2025-07-11 ENCOUNTER — APPOINTMENT (OUTPATIENT)
Age: 60
End: 2025-07-11
Payer: COMMERCIAL

## 2025-07-11 VITALS
RESPIRATION RATE: 16 BRPM | WEIGHT: 189 LBS | SYSTOLIC BLOOD PRESSURE: 124 MMHG | HEART RATE: 88 BPM | HEIGHT: 66 IN | BODY MASS INDEX: 30.37 KG/M2 | OXYGEN SATURATION: 98 % | TEMPERATURE: 98.8 F | DIASTOLIC BLOOD PRESSURE: 74 MMHG

## 2025-07-11 PROBLEM — R10.30 GROIN PAIN: Status: ACTIVE | Noted: 2025-07-11

## 2025-07-11 PROBLEM — L98.9 SKIN LESIONS: Status: ACTIVE | Noted: 2025-07-11

## 2025-07-11 PROBLEM — D58.2 ELEVATED HEMOGLOBIN: Status: ACTIVE | Noted: 2025-07-11

## 2025-07-11 PROCEDURE — 99214 OFFICE O/P EST MOD 30 MIN: CPT

## 2025-07-24 DIAGNOSIS — R30.0 DYSURIA: ICD-10-CM

## 2025-07-24 RX ORDER — SULFAMETHOXAZOLE AND TRIMETHOPRIM 800; 160 MG/1; MG/1
800-160 TABLET ORAL TWICE DAILY
Qty: 10 | Refills: 0 | Status: ACTIVE | COMMUNITY
Start: 2025-07-24 | End: 1900-01-01

## 2025-08-15 ENCOUNTER — APPOINTMENT (OUTPATIENT)
Dept: DERMATOLOGY | Facility: CLINIC | Age: 60
End: 2025-08-15

## 2025-08-22 ENCOUNTER — RX RENEWAL (OUTPATIENT)
Age: 60
End: 2025-08-22

## 2025-09-04 ENCOUNTER — NON-APPOINTMENT (OUTPATIENT)
Age: 60
End: 2025-09-04

## 2025-09-04 ENCOUNTER — RX RENEWAL (OUTPATIENT)
Age: 60
End: 2025-09-04

## 2025-09-05 ENCOUNTER — APPOINTMENT (OUTPATIENT)
Dept: OBGYN | Facility: CLINIC | Age: 60
End: 2025-09-05
Payer: COMMERCIAL

## 2025-09-05 ENCOUNTER — APPOINTMENT (OUTPATIENT)
Dept: DERMATOLOGY | Facility: CLINIC | Age: 60
End: 2025-09-05
Payer: COMMERCIAL

## 2025-09-05 ENCOUNTER — NON-APPOINTMENT (OUTPATIENT)
Age: 60
End: 2025-09-05

## 2025-09-05 VITALS
HEART RATE: 84 BPM | SYSTOLIC BLOOD PRESSURE: 124 MMHG | OXYGEN SATURATION: 96 % | HEIGHT: 66 IN | DIASTOLIC BLOOD PRESSURE: 70 MMHG | WEIGHT: 185 LBS | TEMPERATURE: 97.8 F | BODY MASS INDEX: 29.73 KG/M2

## 2025-09-05 DIAGNOSIS — L57.8 OTHER SKIN CHANGES DUE TO CHRONIC EXPOSURE TO NONIONIZING RADIATION: ICD-10-CM

## 2025-09-05 DIAGNOSIS — Z01.419 ENCOUNTER FOR GYNECOLOGICAL EXAMINATION (GENERAL) (ROUTINE) W/OUT ABNORMAL FINDINGS: ICD-10-CM

## 2025-09-05 DIAGNOSIS — N76.0 ACUTE VAGINITIS: ICD-10-CM

## 2025-09-05 DIAGNOSIS — Z87.440 PERSONAL HISTORY OF URINARY (TRACT) INFECTIONS: ICD-10-CM

## 2025-09-05 DIAGNOSIS — L57.0 ACTINIC KERATOSIS: ICD-10-CM

## 2025-09-05 DIAGNOSIS — L82.1 OTHER SEBORRHEIC KERATOSIS: ICD-10-CM

## 2025-09-05 LAB
BILIRUB UR QL STRIP: NEGATIVE
CLARITY UR: CLEAR
COLLECTION METHOD: NORMAL
GLUCOSE UR-MCNC: NEGATIVE
HCG UR QL: 0.2 EU/DL
HGB UR QL STRIP.AUTO: NORMAL
KETONES UR-MCNC: NEGATIVE
LEUKOCYTE ESTERASE UR QL STRIP: NEGATIVE
NITRITE UR QL STRIP: NEGATIVE
PH UR STRIP: 5
PROT UR STRIP-MCNC: NEGATIVE
SP GR UR STRIP: 1.01

## 2025-09-05 PROCEDURE — 99213 OFFICE O/P EST LOW 20 MIN: CPT | Mod: 25

## 2025-09-05 PROCEDURE — 99396 PREV VISIT EST AGE 40-64: CPT

## 2025-09-05 PROCEDURE — 99459 PELVIC EXAMINATION: CPT

## 2025-09-05 PROCEDURE — 99203 OFFICE O/P NEW LOW 30 MIN: CPT

## 2025-09-08 LAB
C TRACH RRNA SPEC QL NAA+PROBE: NOT DETECTED
CANDIDA VAG CYTO: NOT DETECTED
G VAGINALIS+PREV SP MTYP VAG QL MICRO: DETECTED
HPV HIGH+LOW RISK DNA PNL CVX: NOT DETECTED
N GONORRHOEA RRNA SPEC QL NAA+PROBE: NOT DETECTED
SOURCE AMPLIFICATION: NORMAL
T VAGINALIS VAG QL WET PREP: NOT DETECTED

## 2025-09-08 RX ORDER — AMOXICILLIN AND CLAVULANATE POTASSIUM 875; 125 MG/1; MG/1
875-125 TABLET, COATED ORAL
Qty: 14 | Refills: 0 | Status: ACTIVE | COMMUNITY
Start: 2025-09-08 | End: 1900-01-01

## 2025-09-08 RX ORDER — METRONIDAZOLE 7.5 MG/G
0.75 GEL VAGINAL
Qty: 1 | Refills: 0 | Status: ACTIVE | COMMUNITY
Start: 2025-09-08 | End: 1900-01-01

## 2025-09-08 RX ORDER — FLUCONAZOLE 150 MG/1
150 TABLET ORAL
Qty: 2 | Refills: 0 | Status: ACTIVE | COMMUNITY
Start: 2025-09-08 | End: 1900-01-01

## 2025-09-09 LAB — BACTERIA UR CULT: ABNORMAL

## 2025-09-09 RX ORDER — NITROFURANTOIN (MONOHYDRATE/MACROCRYSTALS) 25; 75 MG/1; MG/1
100 CAPSULE ORAL
Qty: 14 | Refills: 0 | Status: ACTIVE | COMMUNITY
Start: 2025-09-09 | End: 1900-01-01

## 2025-09-11 LAB — CYTOLOGY CVX/VAG DOC THIN PREP: NORMAL

## (undated) DEVICE — DRAPE INSTRUMENT POUCH 6.75" X 11"

## (undated) DEVICE — PACK FEM/POP

## (undated) DEVICE — SUT VLOC 180 2-0 9" GS-22 GREEN

## (undated) DEVICE — GLV 7 PROTEXIS (WHITE)

## (undated) DEVICE — PACK BASIN SPECIAL PROCEDURE

## (undated) DEVICE — SOL IRR POUR NS 0.9% 500ML

## (undated) DEVICE — DRAPE TOWEL BLUE 17" X 24"

## (undated) DEVICE — GLV 7.5 PROTEXIS (BLUE)

## (undated) DEVICE — DRAPE 3/4 SHEET 52X76"

## (undated) DEVICE — WARMING BLANKET UPPER ADULT

## (undated) DEVICE — SUT VICRYL 0 27" UR-6

## (undated) DEVICE — POSITIONER FOAM EGG CRATE ULNAR 2PCS (PINK)

## (undated) DEVICE — TUBING HIGH POWER CONTRAST INJ

## (undated) DEVICE — INFLATION DEVICE BASIXCOMPAK

## (undated) DEVICE — XI ARM NEEDLE DRIVER LARGE

## (undated) DEVICE — FOLEY TRAY 16FR 5CC LTX UMETER CLOSED

## (undated) DEVICE — BLADE SCALPEL SAFETYLOCK #10

## (undated) DEVICE — BLADE SURGICAL #15 CARBON

## (undated) DEVICE — XI ARM SCISSOR MONO CURVED

## (undated) DEVICE — TUBING STRYKER PNEUMOCLEAR SMOKE EVACUATION HIGH FLOW

## (undated) DEVICE — GLV 7.5 PROTEXIS (WHITE)

## (undated) DEVICE — INSUFFLATION NDL ETHICON ENDOPATH VERESS 14G 120MM

## (undated) DEVICE — DRAPE SPLIT SHEET 77" X 120"

## (undated) DEVICE — SUT VLOC 180 2-0 6" GS-22 GREEN

## (undated) DEVICE — SUT SOFSILK 0 18" TIES

## (undated) DEVICE — BLADE SCALPEL SAFETYLOCK #15

## (undated) DEVICE — DRAPE FEMORAL ANGIOGRAPHY 80X112"

## (undated) DEVICE — MEDICATION LABELS W MARKER

## (undated) DEVICE — DRAPE C ARM UNIVERSAL

## (undated) DEVICE — SOL IRR POUR H2O 250ML

## (undated) DEVICE — XI SEAL UNIVERSIAL 5-12MM

## (undated) DEVICE — DRAPE 1/2 SHEET 40X57"

## (undated) DEVICE — GOWN TRIMAX LG

## (undated) DEVICE — SUT PROLENE 6-0 4-30" BV-1

## (undated) DEVICE — SUT SOFSILK 3-0 30" TIES

## (undated) DEVICE — COVER PROBE W/GEL 18X120CM STRL 50/BX

## (undated) DEVICE — SUT MONOCRYL 4-0 27" PS-2 UNDYED

## (undated) DEVICE — NDL PERC BASEPLT 18GX7CM

## (undated) DEVICE — DRAPE 3/4 SHEET W REINFORCEMENT 56X77"

## (undated) DEVICE — XI ARM FORCEP FENESTRATED BIPOLAR 8MM

## (undated) DEVICE — TAPE GLO-N-TELL RADIOPAQUE 20 STRIPS

## (undated) DEVICE — DRSG OPSITE 13.75 X 4"

## (undated) DEVICE — Device

## (undated) DEVICE — DRAPE MAYO STAND 30"

## (undated) DEVICE — XI DRAPE COLUMN

## (undated) DEVICE — SUT VLOC 180 0 18" GS-21 GREEN

## (undated) DEVICE — ELCTR STRYKER NEPTUNE SMOKE EVACUATION PENCIL (GREEN)

## (undated) DEVICE — SUT VLOC 180 0 9" GS-21 GREEN

## (undated) DEVICE — DRAPE EQUIPMENT BANDED BAG 30 X 30" (SHOWER CAP)

## (undated) DEVICE — D HELP - CLEARVIEW CLEARIFY SYSTEM

## (undated) DEVICE — TORQUE DEVICE FOR GUIDEWIRE 0.0100.038"

## (undated) DEVICE — PACK GENERAL LAPAROSCOPY

## (undated) DEVICE — VENODYNE/SCD SLEEVE CALF MEDIUM

## (undated) DEVICE — SUT BIOSYN 4-0 18" P-12

## (undated) DEVICE — XI OBTURATOR OPTICAL BLADELESS 8MM

## (undated) DEVICE — XI DRAPE ARM

## (undated) DEVICE — DRSG TEGADERM 6 X 8"

## (undated) DEVICE — SUT VLOC 180 0 12" GS-21 GREEN

## (undated) DEVICE — SUT SOFSILK 2-0 18" TIES

## (undated) DEVICE — BLADE SCALPEL SAFETYLOCK #11

## (undated) DEVICE — SUCTION YANKAUER NO CONTROL VENT

## (undated) DEVICE — SPECIMEN CONTAINER 100ML

## (undated) DEVICE — SUT SOFSILK 4-0 18" TIES

## (undated) DEVICE — DRAPE IOBAN 23" X 23"

## (undated) DEVICE — SUT PROLENE 6-0 4-30" C-1

## (undated) DEVICE — SUT POLYSORB 2-0 30" GS-21 UNDYED

## (undated) DEVICE — SUT VLOC 180 2-0 12" V-20 GREEN

## (undated) DEVICE — SYR CONTRAST 10ML

## (undated) DEVICE — SYR LUER LOK 20CC

## (undated) DEVICE — SOL INJ NS 0.9% 1000ML

## (undated) DEVICE — VESSEL LOOP MINI-BLUE 0.075" X 16"

## (undated) DEVICE — DRSG STERISTRIPS 0.5 X 4"

## (undated) DEVICE — NDL HYPO SAFE 22G X 1.5" (BLACK)

## (undated) DEVICE — TUBING CONTRAST INJECTION HIGH PRESSURE 1200PSI 72"

## (undated) DEVICE — STAPLER SKIN VISI-STAT 35 WIDE

## (undated) DEVICE — XI SCISSOR TIP COVER

## (undated) DEVICE — PREP CHLORAPREP HI-LITE ORANGE 26ML